# Patient Record
Sex: FEMALE | Race: BLACK OR AFRICAN AMERICAN | NOT HISPANIC OR LATINO | Employment: OTHER | URBAN - METROPOLITAN AREA
[De-identification: names, ages, dates, MRNs, and addresses within clinical notes are randomized per-mention and may not be internally consistent; named-entity substitution may affect disease eponyms.]

---

## 2020-09-23 RX ORDER — CLOPIDOGREL BISULFATE 75 MG/1
TABLET ORAL
Qty: 30 TABLET | Refills: 0 | Status: SHIPPED | OUTPATIENT
Start: 2020-09-23 | End: 2020-10-20

## 2020-10-05 ENCOUNTER — TELEPHONE (OUTPATIENT)
Dept: CARDIOLOGY | Facility: CLINIC | Age: 70
End: 2020-10-05

## 2020-10-05 RX ORDER — METOPROLOL TARTRATE 25 MG/1
TABLET, FILM COATED ORAL
Qty: 30 TABLET | Refills: 11 | Status: SHIPPED | OUTPATIENT
Start: 2020-10-05 | End: 2020-10-07 | Stop reason: SDUPTHER

## 2020-10-05 NOTE — TELEPHONE ENCOUNTER
----- Message from Nevaeh Eckert sent at 10/5/2020  8:52 AM CDT -----  Regarding: medication and lab results  Please call patient to follow up about her medication request she is out of medicine  and lab results [phone]

## 2020-10-08 RX ORDER — METOPROLOL TARTRATE 25 MG/1
25 TABLET, FILM COATED ORAL 2 TIMES DAILY
Qty: 90 TABLET | Refills: 3 | Status: SHIPPED | OUTPATIENT
Start: 2020-10-08 | End: 2021-10-04 | Stop reason: SDUPTHER

## 2020-12-17 ENCOUNTER — OFFICE VISIT (OUTPATIENT)
Dept: CARDIOLOGY | Facility: CLINIC | Age: 70
End: 2020-12-17
Payer: MEDICARE

## 2020-12-17 VITALS
RESPIRATION RATE: 20 BRPM | SYSTOLIC BLOOD PRESSURE: 148 MMHG | DIASTOLIC BLOOD PRESSURE: 88 MMHG | OXYGEN SATURATION: 97 % | BODY MASS INDEX: 36.1 KG/M2 | WEIGHT: 230 LBS | HEIGHT: 67 IN | HEART RATE: 76 BPM

## 2020-12-17 DIAGNOSIS — I25.10 CORONARY ARTERY DISEASE WITHOUT ANGINA PECTORIS, UNSPECIFIED VESSEL OR LESION TYPE, UNSPECIFIED WHETHER NATIVE OR TRANSPLANTED HEART: ICD-10-CM

## 2020-12-17 DIAGNOSIS — E78.5 DYSLIPIDEMIA: ICD-10-CM

## 2020-12-17 DIAGNOSIS — I10 HYPERTENSION, UNSPECIFIED TYPE: Primary | ICD-10-CM

## 2020-12-17 DIAGNOSIS — R60.0 PERIPHERAL EDEMA: ICD-10-CM

## 2020-12-17 PROCEDURE — 99214 OFFICE O/P EST MOD 30 MIN: CPT | Mod: S$GLB,,, | Performed by: INTERNAL MEDICINE

## 2020-12-17 PROCEDURE — 99214 PR OFFICE/OUTPT VISIT, EST, LEVL IV, 30-39 MIN: ICD-10-PCS | Mod: S$GLB,,, | Performed by: INTERNAL MEDICINE

## 2020-12-17 RX ORDER — PANTOPRAZOLE SODIUM 40 MG/1
40 TABLET, DELAYED RELEASE ORAL DAILY
COMMUNITY
End: 2022-05-31 | Stop reason: SDUPTHER

## 2020-12-17 RX ORDER — TERAZOSIN 5 MG/1
5 CAPSULE ORAL NIGHTLY
Qty: 90 CAPSULE | Refills: 3 | Status: SHIPPED | OUTPATIENT
Start: 2020-12-17 | End: 2021-01-27 | Stop reason: DRUGHIGH

## 2020-12-17 RX ORDER — TERAZOSIN 1 MG/1
1 CAPSULE ORAL NIGHTLY
Qty: 1 CAPSULE | Refills: 0 | Status: SHIPPED | OUTPATIENT
Start: 2020-12-17 | End: 2020-12-18

## 2020-12-17 RX ORDER — TERAZOSIN 5 MG/1
5 CAPSULE ORAL NIGHTLY
Qty: 30 CAPSULE | Refills: 11 | Status: SHIPPED | OUTPATIENT
Start: 2020-12-22 | End: 2021-01-26 | Stop reason: SDUPTHER

## 2020-12-17 RX ORDER — TERAZOSIN 1 MG/1
1 CAPSULE ORAL NIGHTLY
Qty: 30 CAPSULE | Refills: 0 | Status: SHIPPED | OUTPATIENT
Start: 2020-12-17 | End: 2021-01-27

## 2020-12-17 RX ORDER — TRIAMTERENE AND HYDROCHLOROTHIAZIDE 37.5; 25 MG/1; MG/1
1 CAPSULE ORAL EVERY MORNING
COMMUNITY
End: 2021-06-02 | Stop reason: SDUPTHER

## 2020-12-17 RX ORDER — ROSUVASTATIN CALCIUM 10 MG/1
10 TABLET, COATED ORAL NIGHTLY
COMMUNITY
End: 2021-06-21

## 2020-12-17 RX ORDER — TERAZOSIN 2 MG/1
2 CAPSULE ORAL NIGHTLY
Qty: 4 CAPSULE | Refills: 0 | Status: SHIPPED | OUTPATIENT
Start: 2020-12-17 | End: 2020-12-21

## 2020-12-17 RX ORDER — CALCIUM CARBONATE 300MG(750)
400 TABLET,CHEWABLE ORAL DAILY
COMMUNITY
End: 2022-10-03 | Stop reason: SDUPTHER

## 2020-12-17 NOTE — ASSESSMENT & PLAN NOTE
She is elevated today  Stop Norvasc  Start Hytrin at 1 mg every night and increase this to 2 mg every night.   Continue checking BP at home.

## 2020-12-17 NOTE — PROGRESS NOTES
Subjective:    Patient ID:  Lindsey Green is a 69 y.o. female patient here for evaluation Hypertension, Coronary Artery Disease, Dyslipidemia, and Gastroesophageal Reflux      History of Present Illness:   Ms. Green is here today for her follow up visit. She denies chest pain or SOB. No arm neck or jaw pain. No lightheaded or dizziness. She has been doing well. Her blood pressure is elevated today, but her legs are hurting her. She is having some edema bilateral legs. She is on amlodipine.           Review of patient's allergies indicates:   Allergen Reactions    Pcn [penicillins]        Past Medical History:   Diagnosis Date    Coronary artery disease involving native coronary artery 11/22/2016    Hypertension      Past Surgical History:   Procedure Laterality Date    ABDOMINAL SURGERY      hysterectomy    BREAST SURGERY      lumpectomy    COLON SURGERY      FRACTURE SURGERY      right wrist    HYSTERECTOMY       Social History     Tobacco Use    Smoking status: Never Smoker   Substance Use Topics    Alcohol use: No    Drug use: No        Review of Systems:    As noted in HPI in addition      REVIEW OF SYSTEMS  CARDIOVASCULAR: No recent chest pain, palpitations, arm, neck, or jaw pain  RESPIRATORY: No recent fever, cough chills, SOB or congestion  : No blood in the urine  GI: No Nausea, vomiting, constipation, diarrhea, blood, or reflux.  MUSCULOSKELETAL: + pain to legs  NEURO: No lightheadedness or dizziness  EYES: No Double vision, blurry, vision or headache              Objective        Vitals:    12/17/20 1247   BP: (!) 148/88   Pulse: 76   Resp: 20       LIPIDS - LAST 2   Lab Results   Component Value Date    CHOL 257 (H) 11/21/2016    HDL 63 11/21/2016    LDLCALC 171.0 (H) 11/21/2016    TRIG 115 11/21/2016    CHOLHDL 24.5 11/21/2016       CBC - LAST 2  Lab Results   Component Value Date    WBC 5.40 11/24/2016    WBC 8.00 11/23/2016    RBC 4.67 11/24/2016    RBC 4.85 11/23/2016    HGB 13.5  11/24/2016    HGB 14.0 11/23/2016    HCT 40.7 11/24/2016    HCT 42.3 11/23/2016    MCV 87 11/24/2016    MCV 87 11/23/2016    MCH 28.9 11/24/2016    MCH 28.9 11/23/2016    MCHC 33.1 11/24/2016    MCHC 33.1 11/23/2016    RDW 13.0 11/24/2016    RDW 13.5 11/23/2016     11/24/2016     11/23/2016    MPV 9.6 11/24/2016    MPV 9.5 11/23/2016    GRAN 2.7 11/24/2016    GRAN 49.4 11/24/2016    LYMPH 2.1 11/24/2016    LYMPH 37.7 11/24/2016    MONO 0.6 11/24/2016    MONO 10.9 11/24/2016    BASO 0.10 11/24/2016    BASO 0.10 11/23/2016       CHEMISTRY & LIVER FUNCTION - LAST 2  Lab Results   Component Value Date     11/24/2016     11/23/2016    K 4.0 11/24/2016    K 4.1 11/23/2016     11/24/2016     11/23/2016    CO2 25 11/24/2016    CO2 23 11/23/2016    ANIONGAP 7 (L) 11/24/2016    ANIONGAP 8 11/23/2016    BUN 12 11/24/2016    BUN 11 11/23/2016    CREATININE 1.0 11/24/2016    CREATININE 1.1 11/23/2016    GLU 91 11/24/2016     (H) 11/23/2016    CALCIUM 9.0 11/24/2016    CALCIUM 9.2 11/23/2016    MG 2.3 11/22/2016    ALBUMIN 3.4 (L) 11/23/2016    ALBUMIN 4.0 11/21/2016    PROT 6.2 11/23/2016    PROT 7.1 11/21/2016    ALKPHOS 74 11/23/2016    ALKPHOS 81 11/21/2016    ALT 29 11/23/2016    ALT 13 11/21/2016     (H) 11/23/2016    AST 24 11/21/2016    BILITOT 0.6 11/23/2016    BILITOT 0.5 11/21/2016        CARDIAC PROFILE - LAST 2  Lab Results   Component Value Date    TROPONINI 37.870 (H) 11/23/2016    TROPONINI 47.066 (H) 11/22/2016        COAGULATION - LAST 2  No results found for: LABPT, INR, APTT    ENDOCRINE & PSA - LAST 2  No results found for: HGBA1C, MICROALBUR, TSH, PROCAL, PSA     ECHOCARDIOGRAM RESULTS  No results found for this or any previous visit.    CURRENT/PREVIOUS VISIT EKG  Results for orders placed or performed during the hospital encounter of 11/21/16   EKG 12-LEAD starting tomorrow    Collection Time: 11/23/16 12:31 AM    Narrative    Test Reason :  I25.10    Vent. Rate : 068 BPM     Atrial Rate : 068 BPM     P-R Int : 174 ms          QRS Dur : 080 ms      QT Int : 470 ms       P-R-T Axes : 058 -26 -55 degrees     QTc Int : 499 ms    Normal sinus rhythm  Inferior infarct (cited on or before 21-NOV-2016)  T wave abnormality, consider anterolateral ischemia  Consider right ventricular involvement in acute inferior infarct  Abnormal ECG  When compared with ECG of 22-NOV-2016 04:58,  Borderline criteria for Lateral infarct are no longer Present  Serial changes of Inferior infarct Present  Confirmed by Zane Booth MD ([id number]) on 12/11/2016 3:07:51 PM    Referred By: JORGE BOYER           Confirmed By:Zane Booth MD         PHYSICAL EXAM  CONSTITUTIONAL: Well built, well nourished in no apparent distress  NECK: no carotid bruit, no JVD  LUNGS: CTA  CHEST WALL: no tenderness  HEART: regular rate and rhythm, S1, S2 normal, no murmur, click, rub or gallop   ABDOMEN: soft, non-tender; bowel sounds normal; no masses,  no organomegaly  EXTREMITIES: mild edema bilaterally  NEURO: AAO X 3    I HAVE REVIEWED :    The vital signs, nurses notes, and all the pertinent radiology and labs.        Current Outpatient Medications   Medication Instructions    aspirin (ECOTRIN) 81 mg, Oral, Daily    clopidogreL (PLAVIX) 75 mg tablet Take 1 tablet by mouth once daily    lisinopriL (PRINIVIL,ZESTRIL) 20 mg, Oral, 2 times daily    magnesium oxide 400 mg, Oral, Daily    metoprolol tartrate (LOPRESSOR) 25 mg, Oral, 2 times daily    pantoprazole (PROTONIX) 40 mg, Oral, Daily    potassium 99 mg Tab Oral, Once    rosuvastatin (CRESTOR) 10 mg, Oral, Nightly    triamterene-hydrochlorothiazide 37.5-25 mg (DYAZIDE) 37.5-25 mg per capsule 1 capsule, Oral, Every morning          Assessment & Plan     Peripheral edema  On amlodipine  Hold this   Start Hytrin 1 mg every night x 2 days then 2 mg QHS    Dyslipidemia  Continue Crestor 10 mg daily    Hypertension  She is elevated today  Stop  Norvasc  Start Hytrin at 1 mg every night and increase this to 2 mg every night.   Continue checking BP at home.     Coronary artery disease without angina pectoris  Continue Plavix and ASA  Continue Statin      No follow-ups on file.

## 2020-12-21 ENCOUNTER — TELEPHONE (OUTPATIENT)
Dept: CARDIOLOGY | Facility: CLINIC | Age: 70
End: 2020-12-21

## 2020-12-21 NOTE — TELEPHONE ENCOUNTER
----- Message from Nevaeh Eckert sent at 12/21/2020  8:35 AM CST -----  Regarding: medication  patient needs someone to call her regarding medication she said multiple refills has been called in some with different mg and she does not need them and the pharmacy is holding her responsible for them payment [phone]

## 2020-12-21 NOTE — TELEPHONE ENCOUNTER
She was questioning why she had a second terazosin sent in, [facility]. She didn't double check when she picked up the second one. I advised her to keep track of her bp and keep the meds. Do not call in for a refill and if a auto refill of the med is given not to accept. She has enough for 2-3 months

## 2020-12-28 ENCOUNTER — TELEPHONE (OUTPATIENT)
Dept: CARDIOLOGY | Facility: CLINIC | Age: 70
End: 2020-12-28

## 2020-12-28 NOTE — TELEPHONE ENCOUNTER
Called patient told her keep the 1 mg if the BP is staying within normal range then it is ok the 5 mg is to much try 2 mg if still goes up to call me before doing the 5 mg again

## 2020-12-28 NOTE — TELEPHONE ENCOUNTER
----- Message from Nathaniel Maldonado sent at 12/28/2020 10:58 AM CST -----  Regarding: MEDS NOT FEELING GOOD  TeRAZOSIN   SHE SAID THE 5MG TAB HAD HER NOT FEELIN GGOOD COULDN'T KEEP HER HEAD UP   SHE SAID SHE THOUGHT IT WAS TAKING HER OUT OF HERE   SHOULD SHE GO BACK TO 1MG     STOPPED IT sATURDAY BECAUSE OF HOW SHE FELT       [phone]

## 2021-01-26 ENCOUNTER — TELEPHONE (OUTPATIENT)
Dept: CARDIOLOGY | Facility: CLINIC | Age: 71
End: 2021-01-26

## 2021-01-26 RX ORDER — TERAZOSIN 5 MG/1
5 CAPSULE ORAL NIGHTLY
Qty: 90 CAPSULE | Refills: 3 | Status: SHIPPED | OUTPATIENT
Start: 2021-01-26 | End: 2021-01-27 | Stop reason: DRUGHIGH

## 2021-03-08 RX ORDER — TERAZOSIN 1 MG/1
1 CAPSULE ORAL NIGHTLY
Qty: 90 CAPSULE | Refills: 3 | Status: SHIPPED | OUTPATIENT
Start: 2021-03-08 | End: 2021-04-26 | Stop reason: SDUPTHER

## 2021-04-26 RX ORDER — TERAZOSIN 1 MG/1
1 CAPSULE ORAL NIGHTLY
Qty: 90 CAPSULE | Refills: 3 | Status: SHIPPED | OUTPATIENT
Start: 2021-04-26 | End: 2022-10-03

## 2021-05-17 ENCOUNTER — TELEPHONE (OUTPATIENT)
Dept: CARDIOLOGY | Facility: CLINIC | Age: 71
End: 2021-05-17

## 2021-05-17 DIAGNOSIS — I25.10 CORONARY ARTERY DISEASE WITHOUT ANGINA PECTORIS, UNSPECIFIED VESSEL OR LESION TYPE, UNSPECIFIED WHETHER NATIVE OR TRANSPLANTED HEART: ICD-10-CM

## 2021-05-17 DIAGNOSIS — R60.0 PERIPHERAL EDEMA: Primary | ICD-10-CM

## 2021-05-17 DIAGNOSIS — E78.5 DYSLIPIDEMIA: ICD-10-CM

## 2021-05-17 DIAGNOSIS — I25.119 CORONARY ARTERY DISEASE INVOLVING NATIVE CORONARY ARTERY OF NATIVE HEART WITH ANGINA PECTORIS: ICD-10-CM

## 2021-05-31 ENCOUNTER — LAB VISIT (OUTPATIENT)
Dept: LAB | Facility: HOSPITAL | Age: 71
End: 2021-05-31
Attending: NURSE PRACTITIONER
Payer: MEDICARE

## 2021-05-31 ENCOUNTER — TELEPHONE (OUTPATIENT)
Dept: CARDIOLOGY | Facility: CLINIC | Age: 71
End: 2021-05-31

## 2021-05-31 ENCOUNTER — OFFICE VISIT (OUTPATIENT)
Dept: CARDIOLOGY | Facility: CLINIC | Age: 71
End: 2021-05-31
Payer: MEDICARE

## 2021-05-31 VITALS
BODY MASS INDEX: 36.37 KG/M2 | HEART RATE: 73 BPM | WEIGHT: 232.19 LBS | SYSTOLIC BLOOD PRESSURE: 122 MMHG | OXYGEN SATURATION: 98 % | DIASTOLIC BLOOD PRESSURE: 76 MMHG

## 2021-05-31 DIAGNOSIS — R63.5 WEIGHT GAIN: ICD-10-CM

## 2021-05-31 DIAGNOSIS — E78.5 DYSLIPIDEMIA: ICD-10-CM

## 2021-05-31 DIAGNOSIS — R60.0 PERIPHERAL EDEMA: ICD-10-CM

## 2021-05-31 DIAGNOSIS — I10 HYPERTENSION, UNSPECIFIED TYPE: ICD-10-CM

## 2021-05-31 DIAGNOSIS — R63.5 WEIGHT GAIN: Primary | ICD-10-CM

## 2021-05-31 DIAGNOSIS — I25.119 CORONARY ARTERY DISEASE INVOLVING NATIVE CORONARY ARTERY OF NATIVE HEART WITH ANGINA PECTORIS: ICD-10-CM

## 2021-05-31 DIAGNOSIS — I25.10 CORONARY ARTERY DISEASE WITHOUT ANGINA PECTORIS, UNSPECIFIED VESSEL OR LESION TYPE, UNSPECIFIED WHETHER NATIVE OR TRANSPLANTED HEART: ICD-10-CM

## 2021-05-31 LAB — TSH SERPL DL<=0.005 MIU/L-ACNC: 0.98 UIU/ML (ref 0.34–5.6)

## 2021-05-31 PROCEDURE — 99214 OFFICE O/P EST MOD 30 MIN: CPT | Mod: S$GLB,,, | Performed by: NURSE PRACTITIONER

## 2021-05-31 PROCEDURE — 84443 ASSAY THYROID STIM HORMONE: CPT | Performed by: NURSE PRACTITIONER

## 2021-05-31 PROCEDURE — 99214 PR OFFICE/OUTPT VISIT, EST, LEVL IV, 30-39 MIN: ICD-10-PCS | Mod: S$GLB,,, | Performed by: NURSE PRACTITIONER

## 2021-05-31 PROCEDURE — 36415 COLL VENOUS BLD VENIPUNCTURE: CPT | Performed by: NURSE PRACTITIONER

## 2021-06-02 RX ORDER — TRIAMTERENE AND HYDROCHLOROTHIAZIDE 37.5; 25 MG/1; MG/1
1 CAPSULE ORAL EVERY MORNING
Qty: 90 CAPSULE | Refills: 3 | Status: SHIPPED | OUTPATIENT
Start: 2021-06-02 | End: 2022-05-31

## 2021-07-09 ENCOUNTER — TELEPHONE (OUTPATIENT)
Dept: CARDIOLOGY | Facility: CLINIC | Age: 71
End: 2021-07-09

## 2021-07-12 ENCOUNTER — TELEPHONE (OUTPATIENT)
Dept: CARDIOLOGY | Facility: CLINIC | Age: 71
End: 2021-07-12

## 2021-08-24 ENCOUNTER — TELEPHONE (OUTPATIENT)
Dept: CARDIOLOGY | Facility: CLINIC | Age: 71
End: 2021-08-24

## 2021-09-07 ENCOUNTER — TELEPHONE (OUTPATIENT)
Dept: CARDIOLOGY | Facility: CLINIC | Age: 71
End: 2021-09-07

## 2021-10-04 ENCOUNTER — OFFICE VISIT (OUTPATIENT)
Dept: CARDIOLOGY | Facility: CLINIC | Age: 71
End: 2021-10-04
Payer: MEDICARE

## 2021-10-04 VITALS
HEART RATE: 72 BPM | RESPIRATION RATE: 16 BRPM | BODY MASS INDEX: 36.57 KG/M2 | SYSTOLIC BLOOD PRESSURE: 132 MMHG | DIASTOLIC BLOOD PRESSURE: 88 MMHG | HEIGHT: 67 IN | WEIGHT: 233 LBS

## 2021-10-04 DIAGNOSIS — E78.5 DYSLIPIDEMIA: Primary | ICD-10-CM

## 2021-10-04 DIAGNOSIS — I25.10 CORONARY ARTERY DISEASE WITHOUT ANGINA PECTORIS, UNSPECIFIED VESSEL OR LESION TYPE, UNSPECIFIED WHETHER NATIVE OR TRANSPLANTED HEART: ICD-10-CM

## 2021-10-04 DIAGNOSIS — I10 PRIMARY HYPERTENSION: ICD-10-CM

## 2021-10-04 PROCEDURE — 99214 OFFICE O/P EST MOD 30 MIN: CPT | Mod: S$GLB,,, | Performed by: INTERNAL MEDICINE

## 2021-10-04 PROCEDURE — 99214 PR OFFICE/OUTPT VISIT, EST, LEVL IV, 30-39 MIN: ICD-10-PCS | Mod: S$GLB,,, | Performed by: INTERNAL MEDICINE

## 2021-10-04 RX ORDER — METOPROLOL TARTRATE 25 MG/1
12.5 TABLET, FILM COATED ORAL 2 TIMES DAILY
Qty: 180 TABLET | Refills: 3 | Status: SHIPPED | OUTPATIENT
Start: 2021-10-04 | End: 2021-10-08

## 2021-11-01 ENCOUNTER — TELEPHONE (OUTPATIENT)
Dept: CARDIOLOGY | Facility: CLINIC | Age: 71
End: 2021-11-01
Payer: MEDICARE

## 2021-11-02 RX ORDER — CLOPIDOGREL BISULFATE 75 MG/1
75 TABLET ORAL DAILY
Qty: 90 TABLET | Refills: 3 | Status: CANCELLED | OUTPATIENT
Start: 2021-11-02

## 2021-11-12 RX ORDER — CLOPIDOGREL BISULFATE 75 MG/1
75 TABLET ORAL DAILY
Qty: 90 TABLET | Refills: 3 | Status: SHIPPED | OUTPATIENT
Start: 2021-11-12 | End: 2023-01-18

## 2022-05-11 ENCOUNTER — TELEPHONE (OUTPATIENT)
Dept: CARDIOLOGY | Facility: CLINIC | Age: 72
End: 2022-05-11
Payer: MEDICARE

## 2022-05-11 NOTE — TELEPHONE ENCOUNTER
----- Message from Wes Erazo sent at 5/11/2022 10:12 AM CDT -----  Type:  Sooner Appointment Request    Caller is requesting a sooner appointment.  Caller declined first available appointment listed below.  Caller will not accept being placed on the waitlist and is requesting a message be sent to doctor.    Name of Caller:  Pt  When is the first available appointment?    Symptoms:  check up 7 months  Best Call Back Number:  [phone]  Additional Information:    Leave vm  if no answer  Please advise == Thank you

## 2022-05-12 ENCOUNTER — TELEPHONE (OUTPATIENT)
Dept: CARDIOLOGY | Facility: CLINIC | Age: 72
End: 2022-05-12
Payer: MEDICARE

## 2022-05-12 NOTE — TELEPHONE ENCOUNTER
----- Message from Wes Erazo sent at 5/12/2022  3:46 PM CDT -----  Type:  Patient Returning Call    Who Called:  Pt  Who Left Message for Patient:  Apple  Does the patient know what this is regarding?:  appt  Best Call Back Number: [phone]  Additional Information:  Sts she just wants to keep check hasn't seen him since last year.  Please advise -- thank you

## 2022-05-19 ENCOUNTER — TELEPHONE (OUTPATIENT)
Dept: CARDIOLOGY | Facility: CLINIC | Age: 72
End: 2022-05-19
Payer: MEDICARE

## 2022-05-19 NOTE — TELEPHONE ENCOUNTER
----- Message from Lazaro Lewis sent at 5/19/2022  4:28 PM CDT -----  Contact: pt  Type: Needs Medical Advice    Who Called:pt  Best Call Back Number:[phone] make appt leave message if need be     Additional Information: Requesting callback regarding got called to RS appt   Please Advise-Thank you

## 2022-05-20 ENCOUNTER — TELEPHONE (OUTPATIENT)
Dept: CARDIOLOGY | Facility: CLINIC | Age: 72
End: 2022-05-20
Payer: MEDICARE

## 2022-05-20 NOTE — TELEPHONE ENCOUNTER
----- Message from Lazaro Lewis sent at 5/20/2022  2:32 PM CDT -----  Will make it to appt 05/23 at 1pm pt confirmed

## 2022-05-23 ENCOUNTER — OFFICE VISIT (OUTPATIENT)
Dept: CARDIOLOGY | Facility: CLINIC | Age: 72
End: 2022-05-23
Payer: MEDICARE

## 2022-05-23 VITALS
SYSTOLIC BLOOD PRESSURE: 126 MMHG | BODY MASS INDEX: 35.63 KG/M2 | OXYGEN SATURATION: 98 % | DIASTOLIC BLOOD PRESSURE: 80 MMHG | WEIGHT: 227 LBS | HEART RATE: 68 BPM | HEIGHT: 67 IN

## 2022-05-23 DIAGNOSIS — I25.119 CORONARY ARTERY DISEASE INVOLVING NATIVE CORONARY ARTERY OF NATIVE HEART WITH ANGINA PECTORIS: ICD-10-CM

## 2022-05-23 DIAGNOSIS — F41.9 ANXIETY: ICD-10-CM

## 2022-05-23 DIAGNOSIS — R60.0 PERIPHERAL EDEMA: ICD-10-CM

## 2022-05-23 DIAGNOSIS — I10 PRIMARY HYPERTENSION: ICD-10-CM

## 2022-05-23 DIAGNOSIS — I25.10 CORONARY ARTERY DISEASE WITHOUT ANGINA PECTORIS, UNSPECIFIED VESSEL OR LESION TYPE, UNSPECIFIED WHETHER NATIVE OR TRANSPLANTED HEART: Primary | ICD-10-CM

## 2022-05-23 DIAGNOSIS — E78.5 DYSLIPIDEMIA: ICD-10-CM

## 2022-05-23 PROCEDURE — 99214 PR OFFICE/OUTPT VISIT, EST, LEVL IV, 30-39 MIN: ICD-10-PCS | Mod: S$GLB,,, | Performed by: NURSE PRACTITIONER

## 2022-05-23 PROCEDURE — 3008F BODY MASS INDEX DOCD: CPT | Mod: CPTII,S$GLB,, | Performed by: NURSE PRACTITIONER

## 2022-05-23 PROCEDURE — 3079F DIAST BP 80-89 MM HG: CPT | Mod: CPTII,S$GLB,, | Performed by: NURSE PRACTITIONER

## 2022-05-23 PROCEDURE — 3288F PR FALLS RISK ASSESSMENT DOCUMENTED: ICD-10-PCS | Mod: CPTII,S$GLB,, | Performed by: NURSE PRACTITIONER

## 2022-05-23 PROCEDURE — 1159F MED LIST DOCD IN RCRD: CPT | Mod: CPTII,S$GLB,, | Performed by: NURSE PRACTITIONER

## 2022-05-23 PROCEDURE — 93000 EKG 12-LEAD: ICD-10-PCS | Mod: S$GLB,,, | Performed by: INTERNAL MEDICINE

## 2022-05-23 PROCEDURE — 1125F AMNT PAIN NOTED PAIN PRSNT: CPT | Mod: CPTII,S$GLB,, | Performed by: NURSE PRACTITIONER

## 2022-05-23 PROCEDURE — 3288F FALL RISK ASSESSMENT DOCD: CPT | Mod: CPTII,S$GLB,, | Performed by: NURSE PRACTITIONER

## 2022-05-23 PROCEDURE — 4010F ACE/ARB THERAPY RXD/TAKEN: CPT | Mod: CPTII,S$GLB,, | Performed by: NURSE PRACTITIONER

## 2022-05-23 PROCEDURE — 1159F PR MEDICATION LIST DOCUMENTED IN MEDICAL RECORD: ICD-10-PCS | Mod: CPTII,S$GLB,, | Performed by: NURSE PRACTITIONER

## 2022-05-23 PROCEDURE — 1101F PT FALLS ASSESS-DOCD LE1/YR: CPT | Mod: CPTII,S$GLB,, | Performed by: NURSE PRACTITIONER

## 2022-05-23 PROCEDURE — 3074F PR MOST RECENT SYSTOLIC BLOOD PRESSURE < 130 MM HG: ICD-10-PCS | Mod: CPTII,S$GLB,, | Performed by: NURSE PRACTITIONER

## 2022-05-23 PROCEDURE — 3079F PR MOST RECENT DIASTOLIC BLOOD PRESSURE 80-89 MM HG: ICD-10-PCS | Mod: CPTII,S$GLB,, | Performed by: NURSE PRACTITIONER

## 2022-05-23 PROCEDURE — 99214 OFFICE O/P EST MOD 30 MIN: CPT | Mod: S$GLB,,, | Performed by: NURSE PRACTITIONER

## 2022-05-23 PROCEDURE — 3074F SYST BP LT 130 MM HG: CPT | Mod: CPTII,S$GLB,, | Performed by: NURSE PRACTITIONER

## 2022-05-23 PROCEDURE — 1125F PR PAIN SEVERITY QUANTIFIED, PAIN PRESENT: ICD-10-PCS | Mod: CPTII,S$GLB,, | Performed by: NURSE PRACTITIONER

## 2022-05-23 PROCEDURE — 1101F PR PT FALLS ASSESS DOC 0-1 FALLS W/OUT INJ PAST YR: ICD-10-PCS | Mod: CPTII,S$GLB,, | Performed by: NURSE PRACTITIONER

## 2022-05-23 PROCEDURE — 4010F PR ACE/ARB THEARPY RXD/TAKEN: ICD-10-PCS | Mod: CPTII,S$GLB,, | Performed by: NURSE PRACTITIONER

## 2022-05-23 PROCEDURE — 3008F PR BODY MASS INDEX (BMI) DOCUMENTED: ICD-10-PCS | Mod: CPTII,S$GLB,, | Performed by: NURSE PRACTITIONER

## 2022-05-23 PROCEDURE — 93000 ELECTROCARDIOGRAM COMPLETE: CPT | Mod: S$GLB,,, | Performed by: INTERNAL MEDICINE

## 2022-05-23 RX ORDER — SERTRALINE HYDROCHLORIDE 25 MG/1
25 TABLET, FILM COATED ORAL DAILY
Qty: 30 TABLET | Refills: 11 | Status: SHIPPED | OUTPATIENT
Start: 2022-05-23 | End: 2023-05-11

## 2022-05-23 NOTE — ASSESSMENT & PLAN NOTE
History of PCI of the right coronary artery she is currently on aspirin and Plavix continue the same.     No Bleeding tendencies.  Bruising has improved  Last Myoview negative in 2018 no new anginal symptoms

## 2022-05-23 NOTE — PROGRESS NOTES
Subjective:    Patient ID:  Lindsey Green is a 71 y.o. female patient here for evaluation Follow-up (Follow up visit 6 months. LEFT leg apin)      History of Present Illness:       Ms. Green is here today for her follow up visit. She denies chest pain or SOB. No arm neck or jaw pain. Her left ankle has some pain and edema at times. No coughing or congestion. No orthopnea. No palpitations. BP stable today 126/80.      Review of patient's allergies indicates:   Allergen Reactions    Pcn [penicillins]        Past Medical History:   Diagnosis Date    Coronary artery disease involving native coronary artery 11/22/2016    Hypertension      Past Surgical History:   Procedure Laterality Date    ABDOMINAL SURGERY      hysterectomy    BREAST SURGERY      lumpectomy    COLON SURGERY      FRACTURE SURGERY      right wrist    HYSTERECTOMY       Social History     Tobacco Use    Smoking status: Never Smoker   Substance Use Topics    Alcohol use: No    Drug use: No        Review of Systems:    As noted in HPI in addition      REVIEW OF SYSTEMS  CARDIOVASCULAR: No recent chest pain, palpitations, arm, neck, or jaw pain  RESPIRATORY: No recent fever, cough chills, SOB or congestion  : No blood in the urine  GI: No Nausea, vomiting, constipation, diarrhea, blood, or reflux.  MUSCULOSKELETAL: left ankle pain  NEURO: No lightheadedness or dizziness  EYES: No Double vision, blurry, vision or headache              Objective        Vitals:    05/23/22 1030   BP: 126/80   Pulse: 68       LIPIDS - LAST 2   Lab Results   Component Value Date    CHOL 257 (H) 11/21/2016    HDL 63 11/21/2016    LDLCALC 171.0 (H) 11/21/2016    TRIG 115 11/21/2016    CHOLHDL 24.5 11/21/2016       CBC - LAST 2  Lab Results   Component Value Date    WBC 5.40 11/24/2016    WBC 8.00 11/23/2016    RBC 4.67 11/24/2016    RBC 4.85 11/23/2016    HGB 13.5 11/24/2016    HGB 14.0 11/23/2016    HCT 40.7 11/24/2016    HCT 42.3 11/23/2016    MCV 87 11/24/2016     MCV 87 11/23/2016    MCH 28.9 11/24/2016    MCH 28.9 11/23/2016    MCHC 33.1 11/24/2016    MCHC 33.1 11/23/2016    RDW 13.0 11/24/2016    RDW 13.5 11/23/2016     11/24/2016     11/23/2016    MPV 9.6 11/24/2016    MPV 9.5 11/23/2016    GRAN 2.7 11/24/2016    GRAN 49.4 11/24/2016    LYMPH 2.1 11/24/2016    LYMPH 37.7 11/24/2016    MONO 0.6 11/24/2016    MONO 10.9 11/24/2016    BASO 0.10 11/24/2016    BASO 0.10 11/23/2016       CHEMISTRY & LIVER FUNCTION - LAST 2  Lab Results   Component Value Date     11/24/2016     11/23/2016    K 4.0 11/24/2016    K 4.1 11/23/2016     11/24/2016     11/23/2016    CO2 25 11/24/2016    CO2 23 11/23/2016    ANIONGAP 7 (L) 11/24/2016    ANIONGAP 8 11/23/2016    BUN 12 11/24/2016    BUN 11 11/23/2016    CREATININE 1.0 11/24/2016    CREATININE 1.1 11/23/2016    GLU 91 11/24/2016     (H) 11/23/2016    CALCIUM 9.0 11/24/2016    CALCIUM 9.2 11/23/2016    MG 2.3 11/22/2016    ALBUMIN 3.4 (L) 11/23/2016    ALBUMIN 4.0 11/21/2016    PROT 6.2 11/23/2016    PROT 7.1 11/21/2016    ALKPHOS 74 11/23/2016    ALKPHOS 81 11/21/2016    ALT 29 11/23/2016    ALT 13 11/21/2016     (H) 11/23/2016    AST 24 11/21/2016    BILITOT 0.6 11/23/2016    BILITOT 0.5 11/21/2016        CARDIAC PROFILE - LAST 2  Lab Results   Component Value Date    TROPONINI 37.870 (H) 11/23/2016    TROPONINI 47.066 (H) 11/22/2016        COAGULATION - LAST 2  No results found for: LABPT, INR, APTT    ENDOCRINE & PSA - LAST 2  Lab Results   Component Value Date    TSH 0.980 05/31/2021        ECHOCARDIOGRAM RESULTS  No results found for this or any previous visit.      CURRENT/PREVIOUS VISIT EKG  Results for orders placed or performed during the hospital encounter of 11/21/16   EKG 12-LEAD starting tomorrow    Collection Time: 11/23/16 12:31 AM    Narrative    Test Reason : I25.10    Vent. Rate : 068 BPM     Atrial Rate : 068 BPM     P-R Int : 174 ms          QRS Dur : 080 ms       QT Int : 470 ms       P-R-T Axes : 058 -26 -55 degrees     QTc Int : 499 ms    Normal sinus rhythm  Inferior infarct (cited on or before 21-NOV-2016)  Abnormal ECG  When compared with ECG of 22-NOV-2016 04:58,  Borderline criteria for Lateral infarct are no longer Present  Serial changes of Inferior infarct Present  Confirmed by Zane Booth MD ([id number]) on 12/11/2016 3:07:51 PM    Referred By: JORGE BOYER           Confirmed By:Zane Booth MD         PHYSICAL EXAM  CONSTITUTIONAL: Well built, well nourished in no apparent distress  NECK: no carotid bruit, no JVD  LUNGS: CTA  CHEST WALL: no tenderness  HEART: regular rate and rhythm, S1, S2 normal, no murmur, click, rub or gallop   ABDOMEN: soft, non-tender; bowel sounds normal; no masses,  no organomegaly  EXTREMITIES: Extremities normal, no edema, no calf tenderness noted  NEURO: AAO X 3    I HAVE REVIEWED :    The vital signs, nurses notes, and all the pertinent radiology and labs.        Current Outpatient Medications   Medication Instructions    aspirin (ECOTRIN) 81 mg, Oral, Daily    clopidogreL (PLAVIX) 75 mg tablet Take 1 tablet by mouth once daily    clopidogreL (PLAVIX) 75 mg, Oral, Daily    lisinopriL (PRINIVIL,ZESTRIL) 20 MG tablet Take 1 tablet by mouth twice daily    magnesium oxide 400 mg, Oral, Daily    metoprolol tartrate (LOPRESSOR) 25 MG tablet Take 1/2 (one-half) tablet by mouth twice daily    pantoprazole (PROTONIX) 40 mg, Oral, Daily    potassium 99 mg Tab Oral, Once    rosuvastatin (CRESTOR) 10 MG tablet TAKE 1 TABLET BY MOUTH AT BEDTIME (STOP SIMVASTAIN)    sertraline (ZOLOFT) 25 mg, Oral, Daily    terazosin (HYTRIN) 1 mg, Oral, Nightly    triamterene-hydrochlorothiazide 37.5-25 mg (DYAZIDE) 37.5-25 mg per capsule 1 capsule, Oral, Every morning          Assessment & Plan     Anxiety  Start Zoloft 25 mg daily      Primary hypertension  BP well controlled on present regimen.   Continue the same    Coronary artery disease involving  native coronary artery of native heart with angina pectoris  H/O PCI RCA  Patient doing well continue the same.  EKG today stable.    Dyslipidemia  Continue Crestor 10 mg daily  Check labs    Coronary artery disease without angina pectoris  History of PCI of the right coronary artery she is currently on aspirin and Plavix continue the same.     No Bleeding tendencies.  Bruising has improved  Last Myoview negative in 2018 no new anginal symptoms      Peripheral edema  Check BNP  Only to one ankle left at times on and off  May be arthritis or gout   Will check labs            No follow-ups on file.

## 2022-05-23 NOTE — ASSESSMENT & PLAN NOTE
Check BNP  Only to one ankle left at times on and off  May be arthritis or gout   Will check labs

## 2022-05-31 NOTE — TELEPHONE ENCOUNTER
----- Message from Raquel Masters sent at 5/31/2022 11:40 AM CDT -----  Contact: pt  Type:  RX Refill Request    Who Called: pt  Refill or New Rx: New Rx  RX Name and Strength:        pantoprazole (PROTONIX) 40 MG tablet  How is the patient currently taking it? (ex. 1XDay):  as ordered  Is this a 30 day or 90 day RX:  90  Preferred Pharmacy with phone number:    [facility] [address] [place], [place] [address] - [address]  [address]  [place] [address]  Phone: [phone] Fax: [phone]      Local or Mail Order:  local  Ordering Provider:  -  Best Call Back Number:  [phone]  Additional Information:  pt is out and needs a new rx  for antoprazole (PROTONIX) 40 MG tablet [facility] to [facility]. Pt thought she had a refill but [facility] informed her she did

## 2022-06-07 RX ORDER — PANTOPRAZOLE SODIUM 40 MG/1
40 TABLET, DELAYED RELEASE ORAL DAILY
Qty: 90 TABLET | Refills: 3 | Status: SHIPPED | OUTPATIENT
Start: 2022-06-07 | End: 2023-05-29 | Stop reason: SDUPTHER

## 2022-06-07 RX ORDER — TRIAMTERENE AND HYDROCHLOROTHIAZIDE 37.5; 25 MG/1; MG/1
CAPSULE ORAL
Qty: 90 CAPSULE | Refills: 3 | Status: SHIPPED | OUTPATIENT
Start: 2022-06-07 | End: 2023-05-29 | Stop reason: SDUPTHER

## 2022-07-15 ENCOUNTER — TELEPHONE (OUTPATIENT)
Dept: CARDIOLOGY | Facility: CLINIC | Age: 72
End: 2022-07-15
Payer: MEDICARE

## 2022-07-15 NOTE — TELEPHONE ENCOUNTER
----- Message from Raquel Masters sent at 7/15/2022  4:27 PM CDT -----  Contact: pt  Type: Needs Medical Advice         Who Called: pt  Best Call Back Number:[phone]  Additional Information: Requesting a call back regarding pt received a letter on 07/07 stating she needs a follow up appt. Pt would like office to call her about an appt.   Please Advise- Thank you

## 2022-07-18 ENCOUNTER — TELEPHONE (OUTPATIENT)
Dept: CARDIOLOGY | Facility: CLINIC | Age: 72
End: 2022-07-18
Payer: MEDICARE

## 2022-07-18 NOTE — TELEPHONE ENCOUNTER
----- Message from Raquel Masters sent at 7/18/2022  4:51 PM CDT -----  Contact: pt  Type: Needs Medical Advice         Who Called: PT   Best Call Back Number:[phone]  Additional Information: Requesting a call back regarding Pt is asking for office to call her back. She received a letter stating she needs t o have a follow up appt but pt is not aware of why and needs office to call her. If she doesn't answer please leave a voicemail     Please Advise- Thank you

## 2022-07-19 NOTE — TELEPHONE ENCOUNTER
Spoke with pt, states she needs an appointment in November. Advised pt , to call back in September as November hasn't been released as of yet. Pt verbalized understanding.

## 2022-08-04 ENCOUNTER — TELEPHONE (OUTPATIENT)
Dept: CARDIOLOGY | Facility: CLINIC | Age: 72
End: 2022-08-04
Payer: MEDICARE

## 2022-08-04 RX ORDER — LISINOPRIL 20 MG/1
20 TABLET ORAL 2 TIMES DAILY
Qty: 180 TABLET | Refills: 3 | Status: SHIPPED | OUTPATIENT
Start: 2022-08-04 | End: 2022-08-08 | Stop reason: ALTCHOICE

## 2022-08-04 NOTE — TELEPHONE ENCOUNTER
----- Message from Thea Tai sent at 8/4/2022 10:07 AM CDT -----  Type:  RX Refill Request  Who Called: Pt   Refill or New Rx: refill  RX Name and Strength: lisinopriL (PRINIVIL,ZESTRIL) 20 MG tablet 180 tablet   How is the patient currently taking it? (ex. 1XDay):    Is this a 30 day or 90 day RX:    Preferred Pharmacy with phone number:  [address], [place] - [address]   Phone:  [phone]  Fax:  [phone]  Local or Mail Order:  Local  Ordering Provider:  Nuno Neri MD  Best Call Back Number:  [phone]  Additional Information: Pt requesting refill on Rx lisinopriL (PRINIVIL,ZESTRIL) 20 MG tablet 180 tablet , pt is out as of today.

## 2022-08-04 NOTE — TELEPHONE ENCOUNTER
PATIENT IS AWARE I DO NOT HAVE AN OPENING FOR DR. BOWLING FOR A BIT, SHE SAID SHE WILL WAIT UNTIL THE October SCHEDULE IS OUT TO GET AN APPOINTMENT

## 2022-08-04 NOTE — TELEPHONE ENCOUNTER
----- Message from Thea Tai sent at 8/4/2022 10:10 AM CDT -----  Type: Needs Medical Advice  Who Called: Pt   Symptoms (please be specific):   How long has patient had these symptoms:    Pharmacy name and phone #:    Best Call Back Number: [phone]  Additional Information: Pt requesting a call back to schedule an appt, pt only wants to see Dr Neri.

## 2022-10-03 ENCOUNTER — OFFICE VISIT (OUTPATIENT)
Dept: CARDIOLOGY | Facility: CLINIC | Age: 72
End: 2022-10-03
Payer: MEDICARE

## 2022-10-03 VITALS
WEIGHT: 223 LBS | SYSTOLIC BLOOD PRESSURE: 134 MMHG | DIASTOLIC BLOOD PRESSURE: 80 MMHG | OXYGEN SATURATION: 99 % | HEART RATE: 65 BPM | HEIGHT: 67 IN | RESPIRATION RATE: 16 BRPM | BODY MASS INDEX: 35 KG/M2

## 2022-10-03 DIAGNOSIS — I10 PRIMARY HYPERTENSION: ICD-10-CM

## 2022-10-03 DIAGNOSIS — F41.9 ANXIETY: ICD-10-CM

## 2022-10-03 DIAGNOSIS — E78.5 DYSLIPIDEMIA: ICD-10-CM

## 2022-10-03 DIAGNOSIS — R60.0 PERIPHERAL EDEMA: ICD-10-CM

## 2022-10-03 DIAGNOSIS — I25.119 CORONARY ARTERY DISEASE INVOLVING NATIVE CORONARY ARTERY OF NATIVE HEART WITH ANGINA PECTORIS: ICD-10-CM

## 2022-10-03 PROCEDURE — 3288F FALL RISK ASSESSMENT DOCD: CPT | Mod: CPTII,S$GLB,, | Performed by: INTERNAL MEDICINE

## 2022-10-03 PROCEDURE — 4010F PR ACE/ARB THEARPY RXD/TAKEN: ICD-10-PCS | Mod: CPTII,S$GLB,, | Performed by: INTERNAL MEDICINE

## 2022-10-03 PROCEDURE — 3079F DIAST BP 80-89 MM HG: CPT | Mod: CPTII,S$GLB,, | Performed by: INTERNAL MEDICINE

## 2022-10-03 PROCEDURE — 3075F SYST BP GE 130 - 139MM HG: CPT | Mod: CPTII,S$GLB,, | Performed by: INTERNAL MEDICINE

## 2022-10-03 PROCEDURE — 1101F PR PT FALLS ASSESS DOC 0-1 FALLS W/OUT INJ PAST YR: ICD-10-PCS | Mod: CPTII,S$GLB,, | Performed by: INTERNAL MEDICINE

## 2022-10-03 PROCEDURE — 3079F PR MOST RECENT DIASTOLIC BLOOD PRESSURE 80-89 MM HG: ICD-10-PCS | Mod: CPTII,S$GLB,, | Performed by: INTERNAL MEDICINE

## 2022-10-03 PROCEDURE — 3008F BODY MASS INDEX DOCD: CPT | Mod: CPTII,S$GLB,, | Performed by: INTERNAL MEDICINE

## 2022-10-03 PROCEDURE — 1126F AMNT PAIN NOTED NONE PRSNT: CPT | Mod: CPTII,S$GLB,, | Performed by: INTERNAL MEDICINE

## 2022-10-03 PROCEDURE — 1101F PT FALLS ASSESS-DOCD LE1/YR: CPT | Mod: CPTII,S$GLB,, | Performed by: INTERNAL MEDICINE

## 2022-10-03 PROCEDURE — 3008F PR BODY MASS INDEX (BMI) DOCUMENTED: ICD-10-PCS | Mod: CPTII,S$GLB,, | Performed by: INTERNAL MEDICINE

## 2022-10-03 PROCEDURE — 1126F PR PAIN SEVERITY QUANTIFIED, NO PAIN PRESENT: ICD-10-PCS | Mod: CPTII,S$GLB,, | Performed by: INTERNAL MEDICINE

## 2022-10-03 PROCEDURE — 3075F PR MOST RECENT SYSTOLIC BLOOD PRESS GE 130-139MM HG: ICD-10-PCS | Mod: CPTII,S$GLB,, | Performed by: INTERNAL MEDICINE

## 2022-10-03 PROCEDURE — 3288F PR FALLS RISK ASSESSMENT DOCUMENTED: ICD-10-PCS | Mod: CPTII,S$GLB,, | Performed by: INTERNAL MEDICINE

## 2022-10-03 PROCEDURE — 1159F PR MEDICATION LIST DOCUMENTED IN MEDICAL RECORD: ICD-10-PCS | Mod: CPTII,S$GLB,, | Performed by: INTERNAL MEDICINE

## 2022-10-03 PROCEDURE — 99214 PR OFFICE/OUTPT VISIT, EST, LEVL IV, 30-39 MIN: ICD-10-PCS | Mod: S$GLB,,, | Performed by: INTERNAL MEDICINE

## 2022-10-03 PROCEDURE — 1159F MED LIST DOCD IN RCRD: CPT | Mod: CPTII,S$GLB,, | Performed by: INTERNAL MEDICINE

## 2022-10-03 PROCEDURE — 4010F ACE/ARB THERAPY RXD/TAKEN: CPT | Mod: CPTII,S$GLB,, | Performed by: INTERNAL MEDICINE

## 2022-10-03 PROCEDURE — 1160F RVW MEDS BY RX/DR IN RCRD: CPT | Mod: CPTII,S$GLB,, | Performed by: INTERNAL MEDICINE

## 2022-10-03 PROCEDURE — 1160F PR REVIEW ALL MEDS BY PRESCRIBER/CLIN PHARMACIST DOCUMENTED: ICD-10-PCS | Mod: CPTII,S$GLB,, | Performed by: INTERNAL MEDICINE

## 2022-10-03 PROCEDURE — 99214 OFFICE O/P EST MOD 30 MIN: CPT | Mod: S$GLB,,, | Performed by: INTERNAL MEDICINE

## 2022-10-03 RX ORDER — METOPROLOL TARTRATE 25 MG/1
TABLET, FILM COATED ORAL
Qty: 90 TABLET | Refills: 3 | Status: SHIPPED | OUTPATIENT
Start: 2022-10-03 | End: 2023-09-07 | Stop reason: SDUPTHER

## 2022-10-03 RX ORDER — ACETAMINOPHEN 160 MG/5ML
200 SUSPENSION, ORAL (FINAL DOSE FORM) ORAL DAILY
Qty: 90 CAPSULE | Refills: 3 | Status: SHIPPED | OUTPATIENT
Start: 2022-10-03

## 2022-10-03 RX ORDER — EZETIMIBE 10 MG/1
10 TABLET ORAL DAILY
Qty: 90 TABLET | Refills: 3 | Status: SHIPPED | OUTPATIENT
Start: 2022-10-03 | End: 2022-12-07

## 2022-10-03 RX ORDER — LANOLIN ALCOHOL/MO/W.PET/CERES
400 CREAM (GRAM) TOPICAL DAILY
Qty: 90 TABLET | Refills: 3 | Status: SHIPPED | OUTPATIENT
Start: 2022-10-03 | End: 2023-10-03

## 2022-10-03 RX ORDER — ACETAMINOPHEN 160 MG/5ML
200 SUSPENSION, ORAL (FINAL DOSE FORM) ORAL DAILY
COMMUNITY
End: 2022-10-03 | Stop reason: SDUPTHER

## 2022-10-03 NOTE — ASSESSMENT & PLAN NOTE
Blood pressure is stable on lisinopril 20 mg once a day continue on low-fat low low-salt diet and continue low doses of metoprolol tartrate 12.5 mg p.o. b.i.d. she is off Hytrin at this time blood pressure is 134/80 mmHg.

## 2022-10-03 NOTE — ASSESSMENT & PLAN NOTE
She is due for repeat blood work.  As a last LDL was still elevated recommend to add Zetia 10 mg to her regimen continue Crestor 10 mg a day maintain on low-fat low-cholesterol diet

## 2022-10-03 NOTE — ASSESSMENT & PLAN NOTE
In view of are are ACS presentation continue on dual antiplatelet therapy continue metoprolol tartrate 12.5 mg p.o. b.i.d. and risk factor modification with Crestor add Zetia 10 mg to her regimen as well

## 2022-10-03 NOTE — PROGRESS NOTES
Subjective:    Patient ID:  Lindsey Green is a 71 y.o. female patient here for evaluation Coronary Artery Disease, Dyslipidemia, and Results (Labs in epic)      History of Present Illness:       Patient is a 69-year-old lady with history of known coronary artery disease and previous presentation with ACS had been doing very well on antiplatelet therapy.  No occurrence of any angina noted she does have some swelling in the left ankle only and she had prior sprain in this ankle while working in the [employer].  Otherwise there is no calf tenderness and no shortness of breath no she remains reasonably active and through this effort no angina noted.    She has maintain on Crestor 10 mg daily no also due for repeat lipid profile she has a started back on Co Q10 200 mg a day      Review of patient's allergies indicates:   Allergen Reactions    Pcn [penicillins]        Past Medical History:   Diagnosis Date    Coronary artery disease involving native coronary artery 11/22/2016    Hypertension      Past Surgical History:   Procedure Laterality Date    ABDOMINAL SURGERY      hysterectomy    BREAST SURGERY      lumpectomy    COLON SURGERY      FRACTURE SURGERY      right wrist    HYSTERECTOMY       Social History     Tobacco Use    Smoking status: Never    Smokeless tobacco: Never   Substance Use Topics    Alcohol use: No    Drug use: No        Review of Systems:    As noted in HPI in addition      REVIEW OF SYSTEMS  CARDIOVASCULAR: No recent chest pain, palpitations, arm, neck, or jaw pain  RESPIRATORY: No recent fever, cough chills, SOB or congestion  : No blood in the urine  GI: No Nausea, vomiting, constipation, diarrhea, blood, or reflux.  MUSCULOSKELETAL: No myalgias, mild swelling in the left lower extremity  NEURO: No lightheadedness or dizziness  EYES: No Double vision, blurry, vision or headache              Objective        Vitals:    10/03/22 0827   BP: 134/80   Pulse: 65   Resp: 16       LIPIDS - LAST 2   Lab  Results   Component Value Date    CHOL 257 (H) 11/21/2016    HDL 63 11/21/2016    LDLCALC 171.0 (H) 11/21/2016    TRIG 115 11/21/2016    CHOLHDL 24.5 11/21/2016       CBC - LAST 2  Lab Results   Component Value Date    WBC 5.40 11/24/2016    WBC 8.00 11/23/2016    RBC 4.67 11/24/2016    RBC 4.85 11/23/2016    HGB 13.5 11/24/2016    HGB 14.0 11/23/2016    HCT 40.7 11/24/2016    HCT 42.3 11/23/2016    MCV 87 11/24/2016    MCV 87 11/23/2016    MCH 28.9 11/24/2016    MCH 28.9 11/23/2016    MCHC 33.1 11/24/2016    MCHC 33.1 11/23/2016    RDW 13.0 11/24/2016    RDW 13.5 11/23/2016     11/24/2016     11/23/2016    MPV 9.6 11/24/2016    MPV 9.5 11/23/2016    GRAN 2.7 11/24/2016    GRAN 49.4 11/24/2016    LYMPH 2.1 11/24/2016    LYMPH 37.7 11/24/2016    MONO 0.6 11/24/2016    MONO 10.9 11/24/2016    BASO 0.10 11/24/2016    BASO 0.10 11/23/2016       CHEMISTRY & LIVER FUNCTION - LAST 2  Lab Results   Component Value Date     11/24/2016     11/23/2016    K 4.0 11/24/2016    K 4.1 11/23/2016     11/24/2016     11/23/2016    CO2 25 11/24/2016    CO2 23 11/23/2016    ANIONGAP 7 (L) 11/24/2016    ANIONGAP 8 11/23/2016    BUN 12 11/24/2016    BUN 11 11/23/2016    CREATININE 1.0 11/24/2016    CREATININE 1.1 11/23/2016    GLU 91 11/24/2016     (H) 11/23/2016    CALCIUM 9.0 11/24/2016    CALCIUM 9.2 11/23/2016    MG 2.3 11/22/2016    ALBUMIN 3.4 (L) 11/23/2016    ALBUMIN 4.0 11/21/2016    PROT 6.2 11/23/2016    PROT 7.1 11/21/2016    ALKPHOS 74 11/23/2016    ALKPHOS 81 11/21/2016    ALT 29 11/23/2016    ALT 13 11/21/2016     (H) 11/23/2016    AST 24 11/21/2016    BILITOT 0.6 11/23/2016    BILITOT 0.5 11/21/2016        CARDIAC PROFILE - LAST 2  Lab Results   Component Value Date    TROPONINI 37.870 (H) 11/23/2016    TROPONINI 47.066 (H) 11/22/2016        COAGULATION - LAST 2  No results found for: LABPT, INR, APTT    ENDOCRINE & PSA - LAST 2  Lab Results   Component Value Date    TSH  0.980 05/31/2021        ECHOCARDIOGRAM RESULTS  No results found for this or any previous visit.      CURRENT/PREVIOUS VISIT EKG  Results for orders placed or performed in visit on 05/23/22   IN OFFICE EKG 12-LEAD (to [place])    Collection Time: 05/23/22 10:33 AM    Narrative    Test Reason : I25.10,    Vent. Rate : 064 BPM     Atrial Rate : 064 BPM     P-R Int : 162 ms          QRS Dur : 082 ms      QT Int : 392 ms       P-R-T Axes : 091 035 010 degrees     QTc Int : 404 ms    Normal sinus rhythm  Low voltage QRS  Inferior infarct (cited on or before 21-NOV-2016)  Possible Anterolateral infarct ,age undetermined  Abnormal ECG  When compared with ECG of 23-NOV-2016 00:31,  Significant changes have occurred  Confirmed by Nuno Neri MD ([id number]) on 6/1/2022 9:09:03 AM    Referred By:             Confirmed By:Nuno Neri MD     No valid procedures specified.   No results found for this or any previous visit.    No valid procedures specified.    PHYSICAL EXAM  CONSTITUTIONAL: Well built, well nourished in no apparent distress  NECK: no carotid bruit, no JVD  LUNGS: CTA  CHEST WALL: no tenderness  HEART: regular rate and rhythm, S1, S2 normal, no murmur, click, rub or gallop   ABDOMEN: soft, non-tender; bowel sounds normal; no masses,  no organomegaly  EXTREMITIES: Extremities trace edema in left lower extremity with no calf tenderness   NEURO: AAO X 3    I HAVE REVIEWED :    The vital signs, nurses notes, and all the pertinent radiology and labs.        Current Outpatient Medications   Medication Instructions    aspirin (ECOTRIN) 81 mg, Oral, Daily    clopidogreL (PLAVIX) 75 mg, Oral, Daily    coenzyme Q10 200 mg, Oral, Daily    ezetimibe (ZETIA) 10 mg, Oral, Daily    lisinopriL (PRINIVIL,ZESTRIL) 20 MG tablet Take 1 tablet by mouth twice daily    magnesium oxide (MAG-OX) 400 mg, Oral, Daily    metoprolol tartrate (LOPRESSOR) 25 MG tablet Take 1/2 (one-half) tablet by mouth twice daily Strength: 25 mg     pantoprazole (PROTONIX) 40 mg, Oral, Daily    potassium 99 mg Tab Oral, Once    rosuvastatin (CRESTOR) 10 MG tablet TAKE 1 TABLET BY MOUTH AT BEDTIME STOP  SIMVASTATIN    sertraline (ZOLOFT) 25 mg, Oral, Daily    triamterene-hydrochlorothiazide 37.5-25 mg (DYAZIDE) 37.5-25 mg per capsule Take 1 capsule by mouth once daily in the morning          Assessment & Plan     Anxiety  This is much better this time continue present regimen    Coronary artery disease involving native coronary artery of native heart with angina pectoris  In view of are are ACS presentation continue on dual antiplatelet therapy continue metoprolol tartrate 12.5 mg p.o. b.i.d. and risk factor modification with Crestor add Zetia 10 mg to her regimen as well    Dyslipidemia  She is due for repeat blood work.  As a last LDL was still elevated recommend to add Zetia 10 mg to her regimen continue Crestor 10 mg a day maintain on low-fat low-cholesterol diet    Peripheral edema  Edema is limited left lower extremities probably secondary to some local injury in the past.  There is no calf tenderness continue on elevation conservative treatment.  And supportive stockings if necessary    Primary hypertension  Blood pressure is stable on lisinopril 20 mg once a day continue on low-fat low low-salt diet and continue low doses of metoprolol tartrate 12.5 mg p.o. b.i.d. she is off Hytrin at this time blood pressure is 134/80 mmHg.    Recheck labs including lipids and hepatic profile.        Follow up in about 6 months (around 4/3/2023).

## 2022-10-03 NOTE — ASSESSMENT & PLAN NOTE
Edema is limited left lower extremities probably secondary to some local injury in the past.  There is no calf tenderness continue on elevation conservative treatment.  And supportive stockings if necessary

## 2022-11-02 ENCOUNTER — TELEPHONE (OUTPATIENT)
Dept: CARDIOLOGY | Facility: CLINIC | Age: 72
End: 2022-11-02
Payer: MEDICARE

## 2022-11-02 NOTE — TELEPHONE ENCOUNTER
----- Message from Apple Moran MA sent at 11/2/2022 11:54 AM CDT -----  Contact: pt at  [phone]    ----- Message -----  From: Bernard Mitchell  Sent: 11/2/2022  11:11 AM CDT  To: Daron Harris Staff    Type: Needs Medical Advice  Who Called:  pt  Best Call Back Number: [phone]  Additional Information: pt is calling the office to return a call back that she missed to find out where to go for lab work. Please call back and advise.

## 2022-11-04 ENCOUNTER — LAB VISIT (OUTPATIENT)
Dept: LAB | Facility: HOSPITAL | Age: 72
End: 2022-11-04
Attending: INTERNAL MEDICINE
Payer: MEDICARE

## 2022-11-04 DIAGNOSIS — I25.119 CORONARY ARTERY DISEASE INVOLVING NATIVE CORONARY ARTERY OF NATIVE HEART WITH ANGINA PECTORIS: ICD-10-CM

## 2022-11-04 DIAGNOSIS — E78.5 DYSLIPIDEMIA: ICD-10-CM

## 2022-11-04 LAB
ALBUMIN SERPL BCP-MCNC: 4.2 G/DL (ref 3.5–5.2)
ALP SERPL-CCNC: 55 U/L (ref 55–135)
ALT SERPL W/O P-5'-P-CCNC: 16 U/L (ref 10–44)
AST SERPL-CCNC: 29 U/L (ref 10–40)
BILIRUB DIRECT SERPL-MCNC: 0.1 MG/DL (ref 0.1–0.3)
BILIRUB SERPL-MCNC: 0.9 MG/DL (ref 0.1–1)
CHOLEST SERPL-MCNC: 149 MG/DL (ref 120–199)
CHOLEST/HDLC SERPL: 1.9 {RATIO} (ref 2–5)
HDLC SERPL-MCNC: 78 MG/DL (ref 40–75)
HDLC SERPL: 52.3 % (ref 20–50)
LDLC SERPL CALC-MCNC: 64.8 MG/DL (ref 63–159)
NONHDLC SERPL-MCNC: 71 MG/DL
PROT SERPL-MCNC: 7 G/DL (ref 6–8.4)
TRIGL SERPL-MCNC: 31 MG/DL (ref 30–150)

## 2022-11-04 PROCEDURE — 80061 LIPID PANEL: CPT | Performed by: INTERNAL MEDICINE

## 2022-11-04 PROCEDURE — 36415 COLL VENOUS BLD VENIPUNCTURE: CPT | Performed by: INTERNAL MEDICINE

## 2022-11-04 PROCEDURE — 80076 HEPATIC FUNCTION PANEL: CPT | Performed by: INTERNAL MEDICINE

## 2022-12-06 ENCOUNTER — TELEPHONE (OUTPATIENT)
Dept: CARDIOLOGY | Facility: CLINIC | Age: 72
End: 2022-12-06
Payer: MEDICARE

## 2022-12-06 NOTE — TELEPHONE ENCOUNTER
----- Message from Naseem Russell sent at 12/6/2022 10:35 AM CST -----  Type: Needs Medical Advice  Who Called:  Pt   Pharmacy name and phone #:    [id number] - [place], [place] - [address]  [address]  [place] [address]  Phone: [phone] Fax: [phone]      Best Call Back Number: [phone]    Additional Information: pt acalled in to get a cheaper medication or something that is close what she has she needs a call back asap

## 2022-12-06 NOTE — TELEPHONE ENCOUNTER
----- Message from Thea Tai sent at 12/6/2022  1:56 PM CST -----  Type:  Patient Returning Call  Who Called:  Pt   Who Left Message for Patient:  Apple   Does the patient know what this is regarding?:  Rx ezetimibe (ZETIA) 10 mg tablet 90 tablet   Best Call Back Number:  [phone]  Additional Information:  Pt missed call from Apple, pt wants to inform Dr Neri that the Rx ezetimibe (ZETIA) 10 mg tablet 90 tablet is too expensive and requesting something cheaper to purchase.  [facility] [address] [place] [address], [place] - [address]   Phone:  [phone]  Fax:  [phone]

## 2022-12-07 RX ORDER — ROSUVASTATIN CALCIUM 20 MG/1
20 TABLET, COATED ORAL DAILY
COMMUNITY
End: 2022-12-07 | Stop reason: SDUPTHER

## 2022-12-07 NOTE — TELEPHONE ENCOUNTER
----- Message from Naseem Russell sent at 12/7/2022  4:46 PM CST -----  Contact: pt at [phone]  Type: Needs Medical Advice  Who Called:  pt   Best Call Back Number: [phone]    Additional Information: pt call back to return a call about her medication please call back to advise the pt

## 2022-12-08 ENCOUNTER — TELEPHONE (OUTPATIENT)
Dept: CARDIOLOGY | Facility: CLINIC | Age: 72
End: 2022-12-08
Payer: MEDICARE

## 2022-12-08 NOTE — TELEPHONE ENCOUNTER
----- Message from Stacey Muro sent at 12/8/2022 10:18 AM CST -----  Contact: Self  Patient is calling to check on the status of the medication changed to the rosuvastatin (CRESTOR) 20 MG tablet since she can't afford the  ezetimibe (ZETIA) 10 mg tablet. Stated she was told they were sending it in to the pharmacy last night.    [facility] [address] [place], [place] [address], [place] - [address]  [address] [facility] [place] [address]  Phone: [phone] Fax: [phone]    Patient is currently at [facility] to pick it up and it is not there, can we make sure we get this medication called in and make sure the other medications are cancelled and the pharmacy is aware of what medications she is to take. Please call pt back to advise so she can  the correct medication at [phone]. Thank You.

## 2022-12-12 RX ORDER — ROSUVASTATIN CALCIUM 20 MG/1
20 TABLET, COATED ORAL DAILY
Qty: 90 TABLET | Refills: 3 | Status: SHIPPED | OUTPATIENT
Start: 2022-12-12 | End: 2023-09-07 | Stop reason: SDUPTHER

## 2023-04-17 ENCOUNTER — LAB VISIT (OUTPATIENT)
Dept: LAB | Facility: HOSPITAL | Age: 73
End: 2023-04-17
Attending: INTERNAL MEDICINE
Payer: MEDICARE

## 2023-04-17 ENCOUNTER — OFFICE VISIT (OUTPATIENT)
Dept: CARDIOLOGY | Facility: CLINIC | Age: 73
End: 2023-04-17
Payer: MEDICARE

## 2023-04-17 VITALS
HEIGHT: 67 IN | DIASTOLIC BLOOD PRESSURE: 80 MMHG | HEART RATE: 58 BPM | BODY MASS INDEX: 36.1 KG/M2 | WEIGHT: 230 LBS | OXYGEN SATURATION: 99 % | SYSTOLIC BLOOD PRESSURE: 126 MMHG | RESPIRATION RATE: 16 BRPM

## 2023-04-17 DIAGNOSIS — E78.5 DYSLIPIDEMIA: ICD-10-CM

## 2023-04-17 DIAGNOSIS — R10.13 DYSPEPSIA: ICD-10-CM

## 2023-04-17 DIAGNOSIS — R06.02 SHORTNESS OF BREATH: ICD-10-CM

## 2023-04-17 DIAGNOSIS — I10 PRIMARY HYPERTENSION: ICD-10-CM

## 2023-04-17 DIAGNOSIS — I25.10 CORONARY ARTERY DISEASE INVOLVING NATIVE CORONARY ARTERY OF NATIVE HEART WITHOUT ANGINA PECTORIS: ICD-10-CM

## 2023-04-17 DIAGNOSIS — R60.0 PERIPHERAL EDEMA: ICD-10-CM

## 2023-04-17 LAB
ANION GAP SERPL CALC-SCNC: 9 MMOL/L (ref 8–16)
BNP SERPL-MCNC: 39 PG/ML (ref 0–99)
BUN SERPL-MCNC: 20 MG/DL (ref 8–23)
CALCIUM SERPL-MCNC: 9.4 MG/DL (ref 8.7–10.5)
CHLORIDE SERPL-SCNC: 101 MMOL/L (ref 95–110)
CO2 SERPL-SCNC: 31 MMOL/L (ref 23–29)
CREAT SERPL-MCNC: 1.5 MG/DL (ref 0.5–1.4)
EST. GFR  (NO RACE VARIABLE): 36.6 ML/MIN/1.73 M^2
GLUCOSE SERPL-MCNC: 77 MG/DL (ref 70–110)
POTASSIUM SERPL-SCNC: 4.2 MMOL/L (ref 3.5–5.1)
SODIUM SERPL-SCNC: 141 MMOL/L (ref 136–145)

## 2023-04-17 PROCEDURE — 99214 PR OFFICE/OUTPT VISIT, EST, LEVL IV, 30-39 MIN: ICD-10-PCS | Mod: S$GLB,,, | Performed by: INTERNAL MEDICINE

## 2023-04-17 PROCEDURE — 1160F PR REVIEW ALL MEDS BY PRESCRIBER/CLIN PHARMACIST DOCUMENTED: ICD-10-PCS | Mod: CPTII,S$GLB,, | Performed by: INTERNAL MEDICINE

## 2023-04-17 PROCEDURE — 83880 ASSAY OF NATRIURETIC PEPTIDE: CPT | Performed by: INTERNAL MEDICINE

## 2023-04-17 PROCEDURE — 4010F PR ACE/ARB THEARPY RXD/TAKEN: ICD-10-PCS | Mod: CPTII,S$GLB,, | Performed by: INTERNAL MEDICINE

## 2023-04-17 PROCEDURE — 1126F AMNT PAIN NOTED NONE PRSNT: CPT | Mod: CPTII,S$GLB,, | Performed by: INTERNAL MEDICINE

## 2023-04-17 PROCEDURE — 3288F FALL RISK ASSESSMENT DOCD: CPT | Mod: CPTII,S$GLB,, | Performed by: INTERNAL MEDICINE

## 2023-04-17 PROCEDURE — 99214 OFFICE O/P EST MOD 30 MIN: CPT | Mod: S$GLB,,, | Performed by: INTERNAL MEDICINE

## 2023-04-17 PROCEDURE — 1101F PT FALLS ASSESS-DOCD LE1/YR: CPT | Mod: CPTII,S$GLB,, | Performed by: INTERNAL MEDICINE

## 2023-04-17 PROCEDURE — 3074F SYST BP LT 130 MM HG: CPT | Mod: CPTII,S$GLB,, | Performed by: INTERNAL MEDICINE

## 2023-04-17 PROCEDURE — 36415 COLL VENOUS BLD VENIPUNCTURE: CPT | Performed by: INTERNAL MEDICINE

## 2023-04-17 PROCEDURE — 3288F PR FALLS RISK ASSESSMENT DOCUMENTED: ICD-10-PCS | Mod: CPTII,S$GLB,, | Performed by: INTERNAL MEDICINE

## 2023-04-17 PROCEDURE — 99999 PR PBB SHADOW E&M-EST. PATIENT-LVL III: ICD-10-PCS | Mod: PBBFAC,,, | Performed by: INTERNAL MEDICINE

## 2023-04-17 PROCEDURE — 1126F PR PAIN SEVERITY QUANTIFIED, NO PAIN PRESENT: ICD-10-PCS | Mod: CPTII,S$GLB,, | Performed by: INTERNAL MEDICINE

## 2023-04-17 PROCEDURE — 3074F PR MOST RECENT SYSTOLIC BLOOD PRESSURE < 130 MM HG: ICD-10-PCS | Mod: CPTII,S$GLB,, | Performed by: INTERNAL MEDICINE

## 2023-04-17 PROCEDURE — 99999 PR PBB SHADOW E&M-EST. PATIENT-LVL III: CPT | Mod: PBBFAC,,, | Performed by: INTERNAL MEDICINE

## 2023-04-17 PROCEDURE — 3008F BODY MASS INDEX DOCD: CPT | Mod: CPTII,S$GLB,, | Performed by: INTERNAL MEDICINE

## 2023-04-17 PROCEDURE — 1101F PR PT FALLS ASSESS DOC 0-1 FALLS W/OUT INJ PAST YR: ICD-10-PCS | Mod: CPTII,S$GLB,, | Performed by: INTERNAL MEDICINE

## 2023-04-17 PROCEDURE — 3079F PR MOST RECENT DIASTOLIC BLOOD PRESSURE 80-89 MM HG: ICD-10-PCS | Mod: CPTII,S$GLB,, | Performed by: INTERNAL MEDICINE

## 2023-04-17 PROCEDURE — 4010F ACE/ARB THERAPY RXD/TAKEN: CPT | Mod: CPTII,S$GLB,, | Performed by: INTERNAL MEDICINE

## 2023-04-17 PROCEDURE — 1160F RVW MEDS BY RX/DR IN RCRD: CPT | Mod: CPTII,S$GLB,, | Performed by: INTERNAL MEDICINE

## 2023-04-17 PROCEDURE — 1159F PR MEDICATION LIST DOCUMENTED IN MEDICAL RECORD: ICD-10-PCS | Mod: CPTII,S$GLB,, | Performed by: INTERNAL MEDICINE

## 2023-04-17 PROCEDURE — 3008F PR BODY MASS INDEX (BMI) DOCUMENTED: ICD-10-PCS | Mod: CPTII,S$GLB,, | Performed by: INTERNAL MEDICINE

## 2023-04-17 PROCEDURE — 1159F MED LIST DOCD IN RCRD: CPT | Mod: CPTII,S$GLB,, | Performed by: INTERNAL MEDICINE

## 2023-04-17 PROCEDURE — 80048 BASIC METABOLIC PNL TOTAL CA: CPT | Performed by: INTERNAL MEDICINE

## 2023-04-17 PROCEDURE — 3079F DIAST BP 80-89 MM HG: CPT | Mod: CPTII,S$GLB,, | Performed by: INTERNAL MEDICINE

## 2023-04-17 NOTE — ASSESSMENT & PLAN NOTE
Her blood pressure is stable at 120 6/80 mm Hg continue lisinopril, 20 mg a day, metoprolol 25 mg daily and continue on triamterene hydrochlorothiazide.

## 2023-04-17 NOTE — PROGRESS NOTES
Subjective:    Patient ID:  Lindsey Green is a 72 y.o. female patient here for evaluation Follow-up      History of Present Illness:   A 72-year-old with history of known coronary artery disease has been struggling with weight management.  She is having exertional shortness of breath and effort capacity has been somewhat limited.  Denies having chest discomfort arm neck or jaw pain she is picked up some weight as well currently she is 230 lb.  BMI is 36.02 kilograms/meter squared  No swelling in the lower extremities no significant palpitations are noted no nausea vomiting.  Intermittent joint pains are noted.  Her blood pressure has been stable at 120 6/80 mm Hg  She is seeking help for arm disability plate for her car because of her marked limitations in physical activity.          Review of patient's allergies indicates:   Allergen Reactions    Pcn [penicillins]        Past Medical History:   Diagnosis Date    Coronary artery disease involving native coronary artery 11/22/2016    Hypertension      Past Surgical History:   Procedure Laterality Date    ABDOMINAL SURGERY      hysterectomy    BREAST SURGERY      lumpectomy    COLON SURGERY      FRACTURE SURGERY      right wrist    HYSTERECTOMY       Social History     Tobacco Use    Smoking status: Never    Smokeless tobacco: Never   Substance Use Topics    Alcohol use: No    Drug use: No        Review of Systems:    As noted in HPI in addition      REVIEW OF SYSTEMS  CARDIOVASCULAR: No recent chest pain, palpitations, arm, neck, or jaw pain  RESPIRATORY: No recent fever, cough chills, SOB or congestion  : No blood in the urine  GI: No Nausea, vomiting, constipation, diarrhea, blood, or reflux.  MUSCULOSKELETAL: No myalgias  NEURO: No lightheadedness or dizziness  EYES: No Double vision, blurry, vision or headache              Objective        Vitals:    04/17/23 0958   BP: 126/80   Pulse: (!) 58   Resp: 16       LIPIDS - LAST 2   Lab Results   Component Value  Date    CHOL 149 11/04/2022    CHOL 257 (H) 11/21/2016    HDL 78 (H) 11/04/2022    HDL 63 11/21/2016    LDLCALC 64.8 11/04/2022    LDLCALC 171.0 (H) 11/21/2016    TRIG 31 11/04/2022    TRIG 115 11/21/2016    CHOLHDL 52.3 (H) 11/04/2022    CHOLHDL 24.5 11/21/2016       CBC - LAST 2  Lab Results   Component Value Date    WBC 5.40 11/24/2016    WBC 8.00 11/23/2016    RBC 4.67 11/24/2016    RBC 4.85 11/23/2016    HGB 13.5 11/24/2016    HGB 14.0 11/23/2016    HCT 40.7 11/24/2016    HCT 42.3 11/23/2016    MCV 87 11/24/2016    MCV 87 11/23/2016    MCH 28.9 11/24/2016    MCH 28.9 11/23/2016    MCHC 33.1 11/24/2016    MCHC 33.1 11/23/2016    RDW 13.0 11/24/2016    RDW 13.5 11/23/2016     11/24/2016     11/23/2016    MPV 9.6 11/24/2016    MPV 9.5 11/23/2016    GRAN 2.7 11/24/2016    GRAN 49.4 11/24/2016    LYMPH 2.1 11/24/2016    LYMPH 37.7 11/24/2016    MONO 0.6 11/24/2016    MONO 10.9 11/24/2016    BASO 0.10 11/24/2016    BASO 0.10 11/23/2016       CHEMISTRY & LIVER FUNCTION - LAST 2  Lab Results   Component Value Date     11/24/2016     11/23/2016    K 4.0 11/24/2016    K 4.1 11/23/2016     11/24/2016     11/23/2016    CO2 25 11/24/2016    CO2 23 11/23/2016    ANIONGAP 7 (L) 11/24/2016    ANIONGAP 8 11/23/2016    BUN 12 11/24/2016    BUN 11 11/23/2016    CREATININE 1.0 11/24/2016    CREATININE 1.1 11/23/2016    GLU 91 11/24/2016     (H) 11/23/2016    CALCIUM 9.0 11/24/2016    CALCIUM 9.2 11/23/2016    MG 2.3 11/22/2016    ALBUMIN 4.2 11/04/2022    ALBUMIN 3.4 (L) 11/23/2016    PROT 7.0 11/04/2022    PROT 6.2 11/23/2016    ALKPHOS 55 11/04/2022    ALKPHOS 74 11/23/2016    ALT 16 11/04/2022    ALT 29 11/23/2016    AST 29 11/04/2022     (H) 11/23/2016    BILITOT 0.9 11/04/2022    BILITOT 0.6 11/23/2016        CARDIAC PROFILE - LAST 2  Lab Results   Component Value Date    TROPONINI 37.870 (H) 11/23/2016    TROPONINI 47.066 (H) 11/22/2016        COAGULATION - LAST 2  No  results found for: LABPT, INR, APTT    ENDOCRINE & PSA - LAST 2  Lab Results   Component Value Date    TSH 0.980 05/31/2021        ECHOCARDIOGRAM RESULTS  No results found for this or any previous visit.      CURRENT/PREVIOUS VISIT EKG  Results for orders placed or performed in visit on 05/23/22   IN OFFICE EKG 12-LEAD (to [facility])    Collection Time: 05/23/22 10:33 AM    Narrative    Test Reason : I25.10,    Vent. Rate : 064 BPM     Atrial Rate : 064 BPM     P-R Int : 162 ms          QRS Dur : 082 ms      QT Int : 392 ms       P-R-T Axes : 091 035 010 degrees     QTc Int : 404 ms    Normal sinus rhythm  Low voltage QRS  Inferior infarct (cited on or before 21-NOV-2016)  Possible Anterolateral infarct ,age undetermined  Abnormal ECG  When compared with ECG of 23-NOV-2016 00:31,  Significant changes have occurred  Confirmed by Nuno Neri MD ([id number]) on 6/1/2022 9:09:03 AM    Referred By:             Confirmed By:Nuno Neri MD     No valid procedures specified.   No results found for this or any previous visit.    No valid procedures specified.    PHYSICAL EXAM  CONSTITUTIONAL: Well built, well nourished in no apparent distress  NECK: no carotid bruit, no JVD  LUNGS:  Diminished breath sounds but clear bilaterally   CHEST WALL: no tenderness  HEART: regular rate and rhythm, S1, S2 normal, no murmur, click, rub or gallop   ABDOMEN: soft, obese, non-tender; bowel sounds normal; no masses,  no organomegaly  EXTREMITIES: Extremities normal, no edema, no calf tenderness noted  NEURO: AAO X 3    I HAVE REVIEWED :    The vital signs, nurses notes, and all the pertinent radiology and labs.        Current Outpatient Medications   Medication Instructions    aspirin (ECOTRIN) 81 mg, Oral, Daily    clopidogreL (PLAVIX) 75 mg tablet Take 1 tablet by mouth once daily    coenzyme Q10 200 mg, Oral, Daily    lisinopriL (PRINIVIL,ZESTRIL) 20 MG tablet Take 1 tablet by mouth twice daily    magnesium oxide (MAG-OX) 400 mg, Oral,  Daily    metoprolol tartrate (LOPRESSOR) 25 MG tablet Take 1/2 (one-half) tablet by mouth twice daily Strength: 25 mg    pantoprazole (PROTONIX) 40 mg, Oral, Daily    potassium 99 mg Tab Oral, Once    rosuvastatin (CRESTOR) 20 mg, Oral, Daily    sertraline (ZOLOFT) 25 mg, Oral, Daily    triamterene-hydrochlorothiazide 37.5-25 mg (DYAZIDE) 37.5-25 mg per capsule Take 1 capsule by mouth once daily in the morning          Assessment & Plan     Coronary artery disease without angina pectoris  Stable coronary artery disease continue on risk factor modification.  She has some progressive shortness of breath may be weight related and however it has been several years since she had Repatha revascularization recommend to obtain a Lexiscan Myoview to evaluate for any progression of coronary disease.  In the meanwhile continue on dual antiplatelet therapy, metoprolol tartrate 25 mg daily and Crestor 20 mg daily for risk factor modification.    Primary hypertension  Her blood pressure is stable at 120 6/80 mm Hg continue lisinopril, 20 mg a day, metoprolol 25 mg daily and continue on triamterene hydrochlorothiazide.    Dyslipidemia  Crestor 20 mg once a day maintain low-fat low-cholesterol diet    Peripheral edema  Clinically improved.    Dyspepsia  Continue on pantoprazole 40 mg a day    Shortness of breath  Continue on present therapy including lisinopril 20, arm metoprolol 25 and I will encourage her to obtain a Lexiscan Myoview to evaluate          Follow up in about 6 weeks (around 5/29/2023).

## 2023-04-17 NOTE — ASSESSMENT & PLAN NOTE
Stable coronary artery disease continue on risk factor modification.  She has some progressive shortness of breath may be weight related and however it has been several years since she had Repatha revascularization recommend to obtain a Lexiscan Myoview to evaluate for any progression of coronary disease.  In the meanwhile continue on dual antiplatelet therapy, metoprolol tartrate 25 mg daily and Crestor 20 mg daily for risk factor modification.

## 2023-04-17 NOTE — ASSESSMENT & PLAN NOTE
Continue on present therapy including lisinopril 20, arm metoprolol 25 and I will encourage her to obtain a Lexiscan Myoview to evaluate

## 2023-04-17 NOTE — LETTER
MEDICAL EXAMINERS CERTIFICATION OF MOBILITY IMPAIRMENT    I certify that (Name) Lindsey Green YOB: 1950     Address  [address]  Race/Sex:   Black or /Female                          (City/State/Zip code)  [place], [place] [address]     meets the requirements as outlined in #  (shown on reverse side) and qualifies for a mobility impaired license plate/hang-tag. I further understand that willful and false certification shall subject me to fines/imprisonment as outlined in R.S. 47:463.4 (G) (4).  []PERMANENTLY IMPAIRED(Applicant  []TEMPORARILY IMPAIRED(Applicant has a temporary   has a total or lifelong condition of condition of mobility impairment from which   mobility impairment from which little improvement or recovery can reasonably be expected.   or no improvement or recovery can Applicant is entitled to a hang-tag which will be   reasonably be expected. A medical valid for one (1) year. A medical examiner's   examiner's certification is required on certification is required for renewal of hang-tag)   initial application only).    []UNABLE TO APPEAR  IN PERSON AT  OFFICE    OF MOTOR VEHICLES(Applicant must bring facial photo)                 Medical Examiner's Signature        Date             Printed Name of medical Examiner     State License #             Address    Telephone #          TO BE COMPLETED BY MOTOR VEHICLE ANALYST ONLY    [place]   Lic. Plate #      Hang-tag Control #   Hang-tag ID #      Date Issued    #   Office #

## 2023-04-26 ENCOUNTER — TELEPHONE (OUTPATIENT)
Dept: CARDIOLOGY | Facility: HOSPITAL | Age: 73
End: 2023-04-26

## 2023-04-26 NOTE — TELEPHONE ENCOUNTER
Left message on voicemail.     Patient advised, test will be at [facility] ([address]   Will need to register on the first floor at the main entrance.   Patient advised that arrival time is 6:50am.  Patient advised that she may be here about 3.5-4 hours, and may want to bring something to occupy their time, as there will be periods of waiting.    Patient advised, may take her medications prior to testing if you need to.   Advised if she needs to eat to take her medications, please keep it light, like toast and juice.    Patient advised to avoid all caffeine 12 hours prior to testing.  This includes decaf tea and coffee.    Will provide peanut butter crackers for a snack after stress test.  If patient would prefer something else, please bring a snack from home.    Wear comfortable clothing.   No lotions, oils, or powders to the upper chest area. May wear deodorant.    No metal jewelry, buttons, or zippers to the upper body.  Advised to call the office if any questions.

## 2023-04-27 ENCOUNTER — HOSPITAL ENCOUNTER (OUTPATIENT)
Dept: RADIOLOGY | Facility: HOSPITAL | Age: 73
Discharge: HOME | End: 2023-04-27
Attending: INTERNAL MEDICINE
Payer: MEDICARE

## 2023-04-27 ENCOUNTER — HOSPITAL ENCOUNTER (OUTPATIENT)
Dept: CARDIOLOGY | Facility: HOSPITAL | Age: 73
Discharge: HOME | End: 2023-04-27
Attending: INTERNAL MEDICINE
Payer: MEDICARE

## 2023-04-27 DIAGNOSIS — I25.10 CORONARY ARTERY DISEASE INVOLVING NATIVE CORONARY ARTERY OF NATIVE HEART WITHOUT ANGINA PECTORIS: ICD-10-CM

## 2023-04-27 DIAGNOSIS — R06.02 SHORTNESS OF BREATH: ICD-10-CM

## 2023-04-27 DIAGNOSIS — I25.10 CORONARY ARTERY DISEASE INVOLVING NATIVE CORONARY ARTERY OF NATIVE HEART WITHOUT ANGINA PECTORIS: Primary | ICD-10-CM

## 2023-04-27 PROCEDURE — 78452 NUCLEAR STRESS - CARDIOLOGY INTERPRETED (CUPID ONLY): ICD-10-PCS | Mod: 26,,, | Performed by: INTERNAL MEDICINE

## 2023-04-27 PROCEDURE — A9502 TC99M TETROFOSMIN: HCPCS

## 2023-04-27 PROCEDURE — 93016 PR CARDIAC STRESS TST,DR SUPERV ONLY: ICD-10-PCS | Mod: ,,, | Performed by: NURSE PRACTITIONER

## 2023-04-27 PROCEDURE — 93018 CV STRESS TEST I&R ONLY: CPT | Mod: ,,, | Performed by: INTERNAL MEDICINE

## 2023-04-27 PROCEDURE — 93018 NUCLEAR STRESS - CARDIOLOGY INTERPRETED (CUPID ONLY): ICD-10-PCS | Mod: ,,, | Performed by: INTERNAL MEDICINE

## 2023-04-27 PROCEDURE — 93017 CV STRESS TEST TRACING ONLY: CPT

## 2023-04-27 PROCEDURE — 78452 HT MUSCLE IMAGE SPECT MULT: CPT | Mod: 26,,, | Performed by: INTERNAL MEDICINE

## 2023-04-27 PROCEDURE — 93016 CV STRESS TEST SUPVJ ONLY: CPT | Mod: ,,, | Performed by: NURSE PRACTITIONER

## 2023-04-27 RX ORDER — REGADENOSON 0.08 MG/ML
0.4 INJECTION, SOLUTION INTRAVENOUS ONCE
Status: COMPLETED | OUTPATIENT
Start: 2023-04-27 | End: 2023-04-27

## 2023-04-27 RX ADMIN — REGADENOSON 0.4 MG: 0.08 INJECTION, SOLUTION INTRAVENOUS at 08:04

## 2023-04-28 LAB
CV PHARM DOSE: 0.4 MG
CV STRESS BASE HR: 60 BPM
DIASTOLIC BLOOD PRESSURE: 68 MMHG
EJECTION FRACTION- HIGH: 65 %
END DIASTOLIC INDEX-HIGH: 153 ML/M2
END DIASTOLIC INDEX-LOW: 93 ML/M2
END SYSTOLIC INDEX-HIGH: 71 ML/M2
END SYSTOLIC INDEX-LOW: 31 ML/M2
NUC REST DIASTOLIC VOLUME INDEX: 74
NUC REST EJECTION FRACTION: 47
NUC REST SYSTOLIC VOLUME INDEX: 39
NUC STRESS DIASTOLIC VOLUME INDEX: 75
NUC STRESS EJECTION FRACTION: 72 %
NUC STRESS SYSTOLIC VOLUME INDEX: 21
OHS CV CPX 1 MINUTE RECOVERY HEART RATE: 99 BPM
OHS CV CPX 85 PERCENT MAX PREDICTED HEART RATE MALE: 120
OHS CV CPX MAX PREDICTED HEART RATE: 142
OHS CV CPX PATIENT IS FEMALE: 1
OHS CV CPX PATIENT IS MALE: 0
OHS CV CPX PEAK DIASTOLIC BLOOD PRESSURE: 70 MMHG
OHS CV CPX PEAK HEAR RATE: 101 BPM
OHS CV CPX PEAK RATE PRESSURE PRODUCT: NORMAL
OHS CV CPX PEAK SYSTOLIC BLOOD PRESSURE: 102 MMHG
OHS CV CPX PERCENT MAX PREDICTED HEART RATE ACHIEVED: 71
OHS CV CPX RATE PRESSURE PRODUCT PRESENTING: 7680
RETIRED EF AND QEF - SEE NOTES: 53 %
SYSTOLIC BLOOD PRESSURE: 128 MMHG

## 2023-05-09 ENCOUNTER — TELEPHONE (OUTPATIENT)
Dept: CARDIOLOGY | Facility: CLINIC | Age: 73
End: 2023-05-09
Payer: MEDICARE

## 2023-05-09 NOTE — TELEPHONE ENCOUNTER
----- Message from Whitney Smith NP sent at 5/1/2023  9:02 AM CDT -----  Your stress test is negative for reversible ischemia. The test is about 90% accurate. This means there are no blockages that are causing a problem, ( meaning over 70% blocked.) According to this test you are getting enough blood flow to the coronary arteries. If you are not having any symptoms we can push back your appointment, and save a copay. If you are having symptoms we will be happy to see you.

## 2023-05-19 ENCOUNTER — TELEPHONE (OUTPATIENT)
Dept: CARDIOLOGY | Facility: CLINIC | Age: 73
End: 2023-05-19
Payer: MEDICARE

## 2023-05-19 NOTE — TELEPHONE ENCOUNTER
Called and went over her test results with her. She is set to come in and go over her results in person. She is happy with her negative test

## 2023-05-19 NOTE — TELEPHONE ENCOUNTER
----- Message from iPto Ravi sent at 5/19/2023 10:45 AM CDT -----    Type: Need Medical Advice   Who Called: Patient   Best callback number: [phone]  Additional Information: Patient called about her test results  Please call to further assist, Thanks

## 2023-05-29 ENCOUNTER — OFFICE VISIT (OUTPATIENT)
Dept: CARDIOLOGY | Facility: CLINIC | Age: 73
End: 2023-05-29
Payer: MEDICARE

## 2023-05-29 VITALS
OXYGEN SATURATION: 98 % | HEART RATE: 69 BPM | BODY MASS INDEX: 35.84 KG/M2 | DIASTOLIC BLOOD PRESSURE: 78 MMHG | HEIGHT: 67 IN | WEIGHT: 228.38 LBS | RESPIRATION RATE: 16 BRPM | SYSTOLIC BLOOD PRESSURE: 122 MMHG

## 2023-05-29 DIAGNOSIS — Z13.29 SCREENING FOR THYROID DISORDER: Primary | ICD-10-CM

## 2023-05-29 DIAGNOSIS — F41.9 ANXIETY: ICD-10-CM

## 2023-05-29 DIAGNOSIS — R60.0 PERIPHERAL EDEMA: ICD-10-CM

## 2023-05-29 DIAGNOSIS — R53.83 FATIGUE, UNSPECIFIED TYPE: ICD-10-CM

## 2023-05-29 DIAGNOSIS — I10 PRIMARY HYPERTENSION: ICD-10-CM

## 2023-05-29 DIAGNOSIS — I10 HYPERTENSION, UNSPECIFIED TYPE: ICD-10-CM

## 2023-05-29 DIAGNOSIS — I25.10 CORONARY ARTERY DISEASE INVOLVING NATIVE CORONARY ARTERY OF NATIVE HEART WITHOUT ANGINA PECTORIS: ICD-10-CM

## 2023-05-29 DIAGNOSIS — E78.5 DYSLIPIDEMIA: ICD-10-CM

## 2023-05-29 PROCEDURE — 1159F MED LIST DOCD IN RCRD: CPT | Mod: CPTII,S$GLB,, | Performed by: NURSE PRACTITIONER

## 2023-05-29 PROCEDURE — 1159F PR MEDICATION LIST DOCUMENTED IN MEDICAL RECORD: ICD-10-PCS | Mod: CPTII,S$GLB,, | Performed by: NURSE PRACTITIONER

## 2023-05-29 PROCEDURE — 3078F DIAST BP <80 MM HG: CPT | Mod: CPTII,S$GLB,, | Performed by: NURSE PRACTITIONER

## 2023-05-29 PROCEDURE — 99214 PR OFFICE/OUTPT VISIT, EST, LEVL IV, 30-39 MIN: ICD-10-PCS | Mod: S$GLB,,, | Performed by: NURSE PRACTITIONER

## 2023-05-29 PROCEDURE — 1126F AMNT PAIN NOTED NONE PRSNT: CPT | Mod: CPTII,S$GLB,, | Performed by: NURSE PRACTITIONER

## 2023-05-29 PROCEDURE — 3078F PR MOST RECENT DIASTOLIC BLOOD PRESSURE < 80 MM HG: ICD-10-PCS | Mod: CPTII,S$GLB,, | Performed by: NURSE PRACTITIONER

## 2023-05-29 PROCEDURE — 1160F PR REVIEW ALL MEDS BY PRESCRIBER/CLIN PHARMACIST DOCUMENTED: ICD-10-PCS | Mod: CPTII,S$GLB,, | Performed by: NURSE PRACTITIONER

## 2023-05-29 PROCEDURE — 99214 OFFICE O/P EST MOD 30 MIN: CPT | Mod: S$GLB,,, | Performed by: NURSE PRACTITIONER

## 2023-05-29 PROCEDURE — 4010F ACE/ARB THERAPY RXD/TAKEN: CPT | Mod: CPTII,S$GLB,, | Performed by: NURSE PRACTITIONER

## 2023-05-29 PROCEDURE — 3288F PR FALLS RISK ASSESSMENT DOCUMENTED: ICD-10-PCS | Mod: CPTII,S$GLB,, | Performed by: NURSE PRACTITIONER

## 2023-05-29 PROCEDURE — 3074F SYST BP LT 130 MM HG: CPT | Mod: CPTII,S$GLB,, | Performed by: NURSE PRACTITIONER

## 2023-05-29 PROCEDURE — 1160F RVW MEDS BY RX/DR IN RCRD: CPT | Mod: CPTII,S$GLB,, | Performed by: NURSE PRACTITIONER

## 2023-05-29 PROCEDURE — 3288F FALL RISK ASSESSMENT DOCD: CPT | Mod: CPTII,S$GLB,, | Performed by: NURSE PRACTITIONER

## 2023-05-29 PROCEDURE — 1101F PT FALLS ASSESS-DOCD LE1/YR: CPT | Mod: CPTII,S$GLB,, | Performed by: NURSE PRACTITIONER

## 2023-05-29 PROCEDURE — 3008F BODY MASS INDEX DOCD: CPT | Mod: CPTII,S$GLB,, | Performed by: NURSE PRACTITIONER

## 2023-05-29 PROCEDURE — 3074F PR MOST RECENT SYSTOLIC BLOOD PRESSURE < 130 MM HG: ICD-10-PCS | Mod: CPTII,S$GLB,, | Performed by: NURSE PRACTITIONER

## 2023-05-29 PROCEDURE — 1101F PR PT FALLS ASSESS DOC 0-1 FALLS W/OUT INJ PAST YR: ICD-10-PCS | Mod: CPTII,S$GLB,, | Performed by: NURSE PRACTITIONER

## 2023-05-29 PROCEDURE — 3008F PR BODY MASS INDEX (BMI) DOCUMENTED: ICD-10-PCS | Mod: CPTII,S$GLB,, | Performed by: NURSE PRACTITIONER

## 2023-05-29 PROCEDURE — 99999 PR PBB SHADOW E&M-EST. PATIENT-LVL III: CPT | Mod: PBBFAC,,, | Performed by: NURSE PRACTITIONER

## 2023-05-29 PROCEDURE — 1126F PR PAIN SEVERITY QUANTIFIED, NO PAIN PRESENT: ICD-10-PCS | Mod: CPTII,S$GLB,, | Performed by: NURSE PRACTITIONER

## 2023-05-29 PROCEDURE — 99999 PR PBB SHADOW E&M-EST. PATIENT-LVL III: ICD-10-PCS | Mod: PBBFAC,,, | Performed by: NURSE PRACTITIONER

## 2023-05-29 PROCEDURE — 4010F PR ACE/ARB THEARPY RXD/TAKEN: ICD-10-PCS | Mod: CPTII,S$GLB,, | Performed by: NURSE PRACTITIONER

## 2023-05-29 RX ORDER — FUROSEMIDE 20 MG/1
20 TABLET ORAL DAILY
Qty: 30 TABLET | Refills: 3 | Status: SHIPPED | OUTPATIENT
Start: 2023-05-29 | End: 2024-05-28

## 2023-05-29 RX ORDER — TRIAMTERENE AND HYDROCHLOROTHIAZIDE 37.5; 25 MG/1; MG/1
1 CAPSULE ORAL EVERY MORNING
Qty: 90 CAPSULE | Refills: 3 | Status: SHIPPED | OUTPATIENT
Start: 2023-05-29 | End: 2023-09-07 | Stop reason: SDUPTHER

## 2023-05-29 RX ORDER — SERTRALINE HYDROCHLORIDE 25 MG/1
25 TABLET, FILM COATED ORAL DAILY
Qty: 30 TABLET | Refills: 2 | Status: SHIPPED | OUTPATIENT
Start: 2023-05-29 | End: 2023-09-13

## 2023-05-29 RX ORDER — LISINOPRIL 20 MG/1
20 TABLET ORAL 2 TIMES DAILY
Qty: 180 TABLET | Refills: 0 | Status: SHIPPED | OUTPATIENT
Start: 2023-05-29 | End: 2023-09-07 | Stop reason: SDUPTHER

## 2023-05-29 RX ORDER — PANTOPRAZOLE SODIUM 40 MG/1
40 TABLET, DELAYED RELEASE ORAL DAILY
Qty: 90 TABLET | Refills: 3 | Status: SHIPPED | OUTPATIENT
Start: 2023-05-29 | End: 2023-09-07 | Stop reason: SDUPTHER

## 2023-05-29 NOTE — PROGRESS NOTES
" Subjective:    Patient ID:  Lindsey Green is a 72 y.o. female patient here for evaluation Follow-up    History of Present Illness:   Pt here to follow up on stress results  Wants to try mild "water pill" to see if this will help swelling in legs  Trying to lose weight; walking frequently; has lost 2 lbs  Possible hormone imbalance: sweats and hot flashes  Has not had thyroid checked ; has no PCP  No CP or shortness of breath  She does not smoke or drink alcohol    Most Recent Echocardiogram Results  No results found for this or any previous visit.      Most Recent Nuclear Stress Test Results  Results for orders placed during the hospital encounter of 04/27/23    Nuclear Stress - Cardiology Interpreted    Interpretation Summary    Abnormal myocardial perfusion scan.  There is no reversible ischemia.    There is a moderate intensity, moderate sized, fixed perfusion abnormality consistent with scar in the basal to apical inferior and inferolateral wall(s) in the typical distribution of the RCA territory.    There are no other significant perfusion abnormalities.    There is a  moderate to severe intensity fixed perfusion abnormality in the inferior wall of the left ventricle secondary to diaphragm attenuation.    The gated perfusion images showed an ejection fraction of 47% at rest. The gated perfusion images showed an ejection fraction of 72% post stress. Normal ejection fraction is greater than 53%.    There is normal wall motion at rest and post stress.    LV cavity size is normal at rest and normal at stress.    The ECG portion of the study is negative for ischemia.    The patient reported no chest pain during the stress test.    There were no arrhythmias during stress.      Most Recent Cardiac PET Stress Test Results  No results found for this or any previous visit.      Most Recent Cardiovascular Angiogram results  No results found for this or any previous visit.      Other Most Recent Cardiology Results  No " results found for this or any previous visit.      REVIEW OF SYSTEMS: As noted in HPI   CARDIOVASCULAR: No recent chest pain, palpitations, arm, neck, or jaw pain.  RESPIRATORY: No recent fever, cough chills, SOB.  : No blood in the urine  GI: No nausea, vomiting, or blood in stool.   MUSCULOSKELETAL: No myalgias or falls.   NEURO: No syncope, lightheadedness, or dizziness.  EYES: No sudden changes in vision.     Past Medical History:   Diagnosis Date    Coronary artery disease involving native coronary artery 11/22/2016    Hypertension      Past Surgical History:   Procedure Laterality Date    ABDOMINAL SURGERY      hysterectomy    BREAST SURGERY      lumpectomy    COLON SURGERY      FRACTURE SURGERY      right wrist    HYSTERECTOMY       Social History     Tobacco Use    Smoking status: Never    Smokeless tobacco: Never   Substance Use Topics    Alcohol use: No    Drug use: No         Objective      Vitals:    05/29/23 0917   BP: 122/78   Pulse: 69   Resp: 16       LAST EKG  Results for orders placed or performed in visit on 05/23/22   IN OFFICE EKG 12-LEAD (to [place])    Collection Time: 05/23/22 10:33 AM    Narrative    Test Reason : I25.10,    Vent. Rate : 064 BPM     Atrial Rate : 064 BPM     P-R Int : 162 ms          QRS Dur : 082 ms      QT Int : 392 ms       P-R-T Axes : 091 035 010 degrees     QTc Int : 404 ms    Normal sinus rhythm  Low voltage QRS  Inferior infarct (cited on or before 21-NOV-2016)  Possible Anterolateral infarct ,age undetermined  Abnormal ECG  When compared with ECG of 23-NOV-2016 00:31,  Significant changes have occurred  Confirmed by Nuno Neri MD ([id number]) on 6/1/2022 9:09:03 AM    Referred By:             Confirmed By:Nuno Neri MD     LIPIDS - LAST 2   Lab Results   Component Value Date    CHOL 149 11/04/2022    CHOL 257 (H) 11/21/2016    HDL 78 (H) 11/04/2022    HDL 63 11/21/2016    LDLCALC 64.8 11/04/2022    LDLCALC 171.0 (H) 11/21/2016    TRIG 31 11/04/2022    TRIG 115  11/21/2016    CHOLHDL 52.3 (H) 11/04/2022    CHOLHDL 24.5 11/21/2016     CARDIAC PROFILE - LAST 2  Lab Results   Component Value Date    BNP 39 04/17/2023    TROPONINI 37.870 (H) 11/23/2016    TROPONINI 47.066 (H) 11/22/2016      CBC - LAST 2  Lab Results   Component Value Date    WBC 5.40 11/24/2016    WBC 8.00 11/23/2016    RBC 4.67 11/24/2016    RBC 4.85 11/23/2016    HGB 13.5 11/24/2016    HGB 14.0 11/23/2016    HCT 40.7 11/24/2016    HCT 42.3 11/23/2016     11/24/2016     11/23/2016     No results found for: LABPT, INR, APTT  CHEMISTRY - LAST 2  Lab Results   Component Value Date     04/17/2023     11/24/2016    K 4.2 04/17/2023    K 4.0 11/24/2016     04/17/2023     11/24/2016    CO2 31 (H) 04/17/2023    CO2 25 11/24/2016    ANIONGAP 9 04/17/2023    ANIONGAP 7 (L) 11/24/2016    BUN 20 04/17/2023    BUN 12 11/24/2016    CREATININE 1.5 (H) 04/17/2023    CREATININE 1.0 11/24/2016    GLU 77 04/17/2023    GLU 91 11/24/2016    CALCIUM 9.4 04/17/2023    CALCIUM 9.0 11/24/2016    MG 2.3 11/22/2016    ALBUMIN 4.2 11/04/2022    ALBUMIN 3.4 (L) 11/23/2016    PROT 7.0 11/04/2022    PROT 6.2 11/23/2016    ALKPHOS 55 11/04/2022    ALKPHOS 74 11/23/2016    ALT 16 11/04/2022    ALT 29 11/23/2016    AST 29 11/04/2022     (H) 11/23/2016    BILITOT 0.9 11/04/2022    BILITOT 0.6 11/23/2016      ENDOCRINE - LAST 2  Lab Results   Component Value Date    TSH 0.980 05/31/2021        PHYSICAL EXAM  CONSTITUTIONAL: Well built, well nourished in no apparent distress  NECK: no carotid bruit, no JVD  LUNGS: CTA  CHEST WALL: no tenderness  HEART: regular rate and rhythm, S1, S2 normal, no murmur, click, rub or gallop   ABDOMEN: soft, non-tender; bowel sounds normal; no masses,  no organomegaly  EXTREMITIES: Extremities normal, no edema, no calf tenderness noted  NEURO: AAO X 3    I HAVE REVIEWED :    The vital signs, most recent cardiac testing, and most recent pertinent non-cardiology provider  notes.    Current Outpatient Medications   Medication Instructions    aspirin (ECOTRIN) 81 mg, Oral, Daily    clopidogreL (PLAVIX) 75 mg tablet Take 1 tablet by mouth once daily    coenzyme Q10 200 mg, Oral, Daily    furosemide (LASIX) 20 mg, Oral, Daily    lisinopriL (PRINIVIL,ZESTRIL) 20 mg, Oral, 2 times daily    magnesium oxide (MAG-OX) 400 mg, Oral, Daily    metoprolol tartrate (LOPRESSOR) 25 MG tablet Take 1/2 (one-half) tablet by mouth twice daily Strength: 25 mg    pantoprazole (PROTONIX) 40 mg, Oral, Daily    potassium 99 mg Tab Oral, Once    rosuvastatin (CRESTOR) 20 mg, Oral, Daily    sertraline (ZOLOFT) 25 mg, Oral, Daily    triamterene-hydrochlorothiazide 37.5-25 mg (DYAZIDE) 37.5-25 mg per capsule 1 capsule, Oral, Every morning      Assessment & Plan     Anxiety  Stable    Coronary artery disease without angina pectoris  Pt has had no chest pain  Recent stress test was negative for reversible ischemia  Patient was to continue aspirin 81 mg daily, Plavix 70 mg daily, rosuvastatin 20 mg p.o. q.h.s., Co Q10 200 mg daily    Peripheral edema  Patient was nonpitting very mild edema to bilateral lower extremities  Bilateral feet are warm and pulses are palpable  There is no calf tenderness  Patient counseled on low-sodium diet  Added 20 mg of Lasix daily  Advised continue low-impact exercise and diet      Primary hypertension  Blood pressure is well controlled in the office today, 122/78 mm Hg.  She states it is well controlled at home as well.  Patient to continue lisinopril 20 mg p.o. b.i.d., metoprolol 25 mg tablet 1/2 tablet b.i.d. and triamterene-hydrochlorothiazide 37.525 mg once daily  Counseled on adherence to low-sodium diet and continue low-impact exercise  Ordered CMP to evaluate kidney function and electrolytes and liver enzymes    BMI 35.0-35.9,adult  Patient discussed struggles with weight loss.  She states she was walking regularly and trying to diet but she was only lost 2 lb.  She has no  primary care provider.  I advised her to follow up with the primary care provider as there are multiple medications on the market that can safely help her lose weight.  Voiced understanding    Also ordered thyroid panel to evaluate thyroid function    Dyslipidemia  Continue rosuvastatin 20 mg p.o. q.h.s. and adhere to a low-cholesterol diet.  Continue low-impact exercise.  Checking CMP to evaluate electrolytes, kidney function, and liver function   Latest Reference Range & Units Most Recent   LDL Cholesterol External 63.0 - 159.0 mg/dL 64.8  11/4/22 08:07

## 2023-05-29 NOTE — ASSESSMENT & PLAN NOTE
Pt has had no chest pain  Recent stress test was negative for reversible ischemia  Patient was to continue aspirin 81 mg daily, Plavix 70 mg daily, rosuvastatin 20 mg p.o. q.h.s., Co Q10 200 mg daily

## 2023-05-29 NOTE — ASSESSMENT & PLAN NOTE
Patient discussed struggles with weight loss.  She states she was walking regularly and trying to diet but she was only lost 2 lb.  She has no primary care provider.  I advised her to follow up with the primary care provider as there are multiple medications on the market that can safely help her lose weight.  Voiced understanding    Also ordered thyroid panel to evaluate thyroid function

## 2023-05-29 NOTE — ASSESSMENT & PLAN NOTE
Patient was nonpitting very mild edema to bilateral lower extremities  Bilateral feet are warm and pulses are palpable  There is no calf tenderness  Patient counseled on low-sodium diet  Added 20 mg of Lasix daily  Advised continue low-impact exercise and diet

## 2023-05-29 NOTE — ASSESSMENT & PLAN NOTE
Continue rosuvastatin 20 mg p.o. q.h.s. and adhere to a low-cholesterol diet.  Continue low-impact exercise.  Checking CMP to evaluate electrolytes, kidney function, and liver function   Latest Reference Range & Units Most Recent   LDL Cholesterol External 63.0 - 159.0 mg/dL 64.8  11/4/22 08:07

## 2023-05-29 NOTE — ASSESSMENT & PLAN NOTE
Blood pressure is well controlled in the office today, 122/78 mm Hg.  She states it is well controlled at home as well.  Patient to continue lisinopril 20 mg p.o. b.i.d., metoprolol 25 mg tablet 1/2 tablet b.i.d. and triamterene-hydrochlorothiazide 37.525 mg once daily  Counseled on adherence to low-sodium diet and continue low-impact exercise  Ordered CMP to evaluate kidney function and electrolytes and liver enzymes

## 2023-08-23 ENCOUNTER — TELEPHONE (OUTPATIENT)
Dept: CARDIOLOGY | Facility: CLINIC | Age: 73
End: 2023-08-23
Payer: MEDICARE

## 2023-08-23 NOTE — TELEPHONE ENCOUNTER
----- Message from Marcelo Kylah sent at 8/23/2023 10:39 AM CDT -----  Regarding: ret call  Contact: lindsey at [phone]  Type:  Patient Returning Call    Who Called:  Lindsey    Who Left Message for Patient:  Claire    Does the patient know what this is regarding?:  yes    Best Call Back Number:  [phone]    Additional Information:  checked schedule with CONOR Lira, CONOR Smith and Dr Neri. No appt avail before end of year. Please call to see if can fit in sooner. Please leave msg with dates avail if do not get pt.

## 2023-08-23 NOTE — TELEPHONE ENCOUNTER
----- Message from Chhaya Santana, Patient Care Assistant sent at 8/23/2023  2:06 PM CDT -----  Regarding: returning call  Contact: pt  Type:  Patient Returning Call    Who Called:  pt     Who Left Message for Patient:  Kiki Marin    Does the patient know what this is regarding?:  yes     Best Call Back Number:  [phone]    Additional Information:  please call pt to advise. Thanks!

## 2023-08-23 NOTE — TELEPHONE ENCOUNTER
----- Message from Ming Mojica sent at 8/23/2023  8:38 AM CDT -----  Regarding: appointment  Contact: patient  Type:  Sooner Apoointment Request    Caller is requesting a sooner appointment.  Caller declined first available appointment listed below.  Caller will not accept being placed on the waitlist and is requesting a message be sent to doctor.  Name of Caller:patient  When is the first available appointment?unable to make appointment today/ please call to schedule next appointment  Symptoms:check up  Would the patient rather a call back or a response via [facility]? call  Best Call Back Number:[phone]  Additional Information: patient requesting to be called if any consolations

## 2023-09-07 ENCOUNTER — LAB VISIT (OUTPATIENT)
Dept: LAB | Facility: HOSPITAL | Age: 73
End: 2023-09-07
Attending: NURSE PRACTITIONER
Payer: MEDICARE

## 2023-09-07 ENCOUNTER — OFFICE VISIT (OUTPATIENT)
Dept: CARDIOLOGY | Facility: CLINIC | Age: 73
End: 2023-09-07
Payer: MEDICARE

## 2023-09-07 VITALS
BODY MASS INDEX: 35.47 KG/M2 | SYSTOLIC BLOOD PRESSURE: 130 MMHG | DIASTOLIC BLOOD PRESSURE: 80 MMHG | WEIGHT: 226 LBS | HEART RATE: 59 BPM | HEIGHT: 67 IN | OXYGEN SATURATION: 98 %

## 2023-09-07 DIAGNOSIS — F41.9 ANXIETY: ICD-10-CM

## 2023-09-07 DIAGNOSIS — R53.83 FATIGUE, UNSPECIFIED TYPE: Primary | ICD-10-CM

## 2023-09-07 DIAGNOSIS — R53.83 FATIGUE, UNSPECIFIED TYPE: ICD-10-CM

## 2023-09-07 DIAGNOSIS — I10 HYPERTENSION, UNSPECIFIED TYPE: ICD-10-CM

## 2023-09-07 DIAGNOSIS — I25.119 CORONARY ARTERY DISEASE INVOLVING NATIVE CORONARY ARTERY OF NATIVE HEART WITH ANGINA PECTORIS: ICD-10-CM

## 2023-09-07 DIAGNOSIS — Z13.29 SCREENING FOR THYROID DISORDER: ICD-10-CM

## 2023-09-07 DIAGNOSIS — E78.5 DYSLIPIDEMIA: ICD-10-CM

## 2023-09-07 DIAGNOSIS — N18.32 STAGE 3B CHRONIC KIDNEY DISEASE: ICD-10-CM

## 2023-09-07 LAB
ALBUMIN SERPL BCP-MCNC: 4.1 G/DL (ref 3.5–5.2)
ALP SERPL-CCNC: 57 U/L (ref 55–135)
ALT SERPL W/O P-5'-P-CCNC: 19 U/L (ref 10–44)
ANION GAP SERPL CALC-SCNC: 3 MMOL/L (ref 8–16)
ANION GAP SERPL CALC-SCNC: 3 MMOL/L (ref 8–16)
AST SERPL-CCNC: 30 U/L (ref 10–40)
BILIRUB SERPL-MCNC: 1 MG/DL (ref 0.1–1)
BUN SERPL-MCNC: 16 MG/DL (ref 8–23)
BUN SERPL-MCNC: 16 MG/DL (ref 8–23)
CALCIUM SERPL-MCNC: 9.8 MG/DL (ref 8.7–10.5)
CALCIUM SERPL-MCNC: 9.8 MG/DL (ref 8.7–10.5)
CHLORIDE SERPL-SCNC: 105 MMOL/L (ref 95–110)
CHLORIDE SERPL-SCNC: 105 MMOL/L (ref 95–110)
CO2 SERPL-SCNC: 29 MMOL/L (ref 23–29)
CO2 SERPL-SCNC: 29 MMOL/L (ref 23–29)
CREAT SERPL-MCNC: 1.4 MG/DL (ref 0.5–1.4)
CREAT SERPL-MCNC: 1.4 MG/DL (ref 0.5–1.4)
EST. GFR  (NO RACE VARIABLE): 39.7 ML/MIN/1.73 M^2
EST. GFR  (NO RACE VARIABLE): 39.7 ML/MIN/1.73 M^2
GLUCOSE SERPL-MCNC: 93 MG/DL (ref 70–110)
GLUCOSE SERPL-MCNC: 93 MG/DL (ref 70–110)
POTASSIUM SERPL-SCNC: 4.1 MMOL/L (ref 3.5–5.1)
POTASSIUM SERPL-SCNC: 4.1 MMOL/L (ref 3.5–5.1)
PROT SERPL-MCNC: 6.9 G/DL (ref 6–8.4)
SODIUM SERPL-SCNC: 137 MMOL/L (ref 136–145)
SODIUM SERPL-SCNC: 137 MMOL/L (ref 136–145)
T4 FREE SERPL-MCNC: 0.71 NG/DL (ref 0.71–1.51)
TSH SERPL DL<=0.005 MIU/L-ACNC: 1.18 UIU/ML (ref 0.34–5.6)
TSH SERPL DL<=0.005 MIU/L-ACNC: 1.18 UIU/ML (ref 0.34–5.6)

## 2023-09-07 PROCEDURE — 99999 PR PBB SHADOW E&M-EST. PATIENT-LVL III: CPT | Mod: PBBFAC,,, | Performed by: NURSE PRACTITIONER

## 2023-09-07 PROCEDURE — 4010F ACE/ARB THERAPY RXD/TAKEN: CPT | Mod: CPTII,S$GLB,, | Performed by: NURSE PRACTITIONER

## 2023-09-07 PROCEDURE — 3075F PR MOST RECENT SYSTOLIC BLOOD PRESS GE 130-139MM HG: ICD-10-PCS | Mod: CPTII,S$GLB,, | Performed by: NURSE PRACTITIONER

## 2023-09-07 PROCEDURE — 3008F PR BODY MASS INDEX (BMI) DOCUMENTED: ICD-10-PCS | Mod: CPTII,S$GLB,, | Performed by: NURSE PRACTITIONER

## 2023-09-07 PROCEDURE — 3075F SYST BP GE 130 - 139MM HG: CPT | Mod: CPTII,S$GLB,, | Performed by: NURSE PRACTITIONER

## 2023-09-07 PROCEDURE — 3288F FALL RISK ASSESSMENT DOCD: CPT | Mod: CPTII,S$GLB,, | Performed by: NURSE PRACTITIONER

## 2023-09-07 PROCEDURE — 1159F PR MEDICATION LIST DOCUMENTED IN MEDICAL RECORD: ICD-10-PCS | Mod: CPTII,S$GLB,, | Performed by: NURSE PRACTITIONER

## 2023-09-07 PROCEDURE — 99213 PR OFFICE/OUTPT VISIT, EST, LEVL III, 20-29 MIN: ICD-10-PCS | Mod: S$GLB,,, | Performed by: NURSE PRACTITIONER

## 2023-09-07 PROCEDURE — 3079F PR MOST RECENT DIASTOLIC BLOOD PRESSURE 80-89 MM HG: ICD-10-PCS | Mod: CPTII,S$GLB,, | Performed by: NURSE PRACTITIONER

## 2023-09-07 PROCEDURE — 3008F BODY MASS INDEX DOCD: CPT | Mod: CPTII,S$GLB,, | Performed by: NURSE PRACTITIONER

## 2023-09-07 PROCEDURE — 1159F MED LIST DOCD IN RCRD: CPT | Mod: CPTII,S$GLB,, | Performed by: NURSE PRACTITIONER

## 2023-09-07 PROCEDURE — 84443 ASSAY THYROID STIM HORMONE: CPT | Performed by: NURSE PRACTITIONER

## 2023-09-07 PROCEDURE — 36415 COLL VENOUS BLD VENIPUNCTURE: CPT | Performed by: NURSE PRACTITIONER

## 2023-09-07 PROCEDURE — 1126F PR PAIN SEVERITY QUANTIFIED, NO PAIN PRESENT: ICD-10-PCS | Mod: CPTII,S$GLB,, | Performed by: NURSE PRACTITIONER

## 2023-09-07 PROCEDURE — 99213 OFFICE O/P EST LOW 20 MIN: CPT | Mod: S$GLB,,, | Performed by: NURSE PRACTITIONER

## 2023-09-07 PROCEDURE — 4010F PR ACE/ARB THEARPY RXD/TAKEN: ICD-10-PCS | Mod: CPTII,S$GLB,, | Performed by: NURSE PRACTITIONER

## 2023-09-07 PROCEDURE — 84439 ASSAY OF FREE THYROXINE: CPT | Performed by: NURSE PRACTITIONER

## 2023-09-07 PROCEDURE — 1126F AMNT PAIN NOTED NONE PRSNT: CPT | Mod: CPTII,S$GLB,, | Performed by: NURSE PRACTITIONER

## 2023-09-07 PROCEDURE — 3079F DIAST BP 80-89 MM HG: CPT | Mod: CPTII,S$GLB,, | Performed by: NURSE PRACTITIONER

## 2023-09-07 PROCEDURE — 99999 PR PBB SHADOW E&M-EST. PATIENT-LVL III: ICD-10-PCS | Mod: PBBFAC,,, | Performed by: NURSE PRACTITIONER

## 2023-09-07 PROCEDURE — 1101F PT FALLS ASSESS-DOCD LE1/YR: CPT | Mod: CPTII,S$GLB,, | Performed by: NURSE PRACTITIONER

## 2023-09-07 PROCEDURE — 3288F PR FALLS RISK ASSESSMENT DOCUMENTED: ICD-10-PCS | Mod: CPTII,S$GLB,, | Performed by: NURSE PRACTITIONER

## 2023-09-07 PROCEDURE — 1101F PR PT FALLS ASSESS DOC 0-1 FALLS W/OUT INJ PAST YR: ICD-10-PCS | Mod: CPTII,S$GLB,, | Performed by: NURSE PRACTITIONER

## 2023-09-07 PROCEDURE — 80053 COMPREHEN METABOLIC PANEL: CPT | Performed by: NURSE PRACTITIONER

## 2023-09-07 RX ORDER — METOPROLOL TARTRATE 25 MG/1
TABLET, FILM COATED ORAL
Qty: 90 TABLET | Refills: 3 | Status: SHIPPED | OUTPATIENT
Start: 2023-09-07

## 2023-09-07 RX ORDER — CLOPIDOGREL BISULFATE 75 MG/1
75 TABLET ORAL DAILY
Qty: 90 TABLET | Refills: 3 | Status: SHIPPED | OUTPATIENT
Start: 2023-09-07

## 2023-09-07 RX ORDER — PANTOPRAZOLE SODIUM 40 MG/1
40 TABLET, DELAYED RELEASE ORAL DAILY
Qty: 90 TABLET | Refills: 3 | Status: SHIPPED | OUTPATIENT
Start: 2023-09-07

## 2023-09-07 RX ORDER — TRIAMTERENE AND HYDROCHLOROTHIAZIDE 37.5; 25 MG/1; MG/1
1 CAPSULE ORAL EVERY MORNING
Qty: 90 CAPSULE | Refills: 3 | Status: SHIPPED | OUTPATIENT
Start: 2023-09-07

## 2023-09-07 RX ORDER — LISINOPRIL 20 MG/1
20 TABLET ORAL 2 TIMES DAILY
Qty: 180 TABLET | Refills: 3 | Status: SHIPPED | OUTPATIENT
Start: 2023-09-07

## 2023-09-07 RX ORDER — ROSUVASTATIN CALCIUM 20 MG/1
20 TABLET, COATED ORAL DAILY
Qty: 90 TABLET | Refills: 3 | Status: SHIPPED | OUTPATIENT
Start: 2023-09-07

## 2023-09-07 NOTE — PROGRESS NOTES
" Subjective:    Patient ID:  Lindsey Green is a 72 y.o. female patient here for evaluation Follow-up    History of Present Illness:     3 mo follow up; needs refills  Denies CP, shortness of breath, syncope, palpitations, PND, orthopnea; No h/a or dizziness  Going to gym TIW, rides bike  Feels heat is making her more fatigued  Tried prn lasix for leg swelling but caused her to feel "whipped out"  No bleeding tendencies from plavix + aspirin  Checking BP at home and it numbers are running controlled  Stress test below    Most Recent Echocardiogram Results  No results found for this or any previous visit.      Most Recent Nuclear Stress Test Results  Results for orders placed during the hospital encounter of 04/27/23    Nuclear Stress - Cardiology Interpreted    Interpretation Summary    Abnormal myocardial perfusion scan.  There is no reversible ischemia.    There is a moderate intensity, moderate sized, fixed perfusion abnormality consistent with scar in the basal to apical inferior and inferolateral wall(s) in the typical distribution of the RCA territory.    There are no other significant perfusion abnormalities.    There is a  moderate to severe intensity fixed perfusion abnormality in the inferior wall of the left ventricle secondary to diaphragm attenuation.    The gated perfusion images showed an ejection fraction of 47% at rest. The gated perfusion images showed an ejection fraction of 72% post stress. Normal ejection fraction is greater than 53%.    There is normal wall motion at rest and post stress.    LV cavity size is normal at rest and normal at stress.    The ECG portion of the study is negative for ischemia.    The patient reported no chest pain during the stress test.    There were no arrhythmias during stress.      Most Recent Cardiac PET Stress Test Results  No results found for this or any previous visit.      Most Recent Cardiovascular Angiogram results  No results found for this or any " previous visit.      Other Most Recent Cardiology Results  No results found for this or any previous visit.      REVIEW OF SYSTEMS: As noted in HPI   CARDIOVASCULAR: No recent chest pain, palpitations, arm/neck/jaw pain, or edema.  RESPIRATORY: No recent fever, cough, SOB.  : No blood in the urine  GI: No reflux, nausea, vomiting, or blood in stool.   MUSCULOSKELETAL: No falls.   NEURO: No headaches, syncope, or dizziness.  EYES: No sudden changes in vision.     Past Medical History:   Diagnosis Date    Coronary artery disease involving native coronary artery 11/22/2016    Hypertension      Past Surgical History:   Procedure Laterality Date    ABDOMINAL SURGERY      hysterectomy    BREAST SURGERY      lumpectomy    COLON SURGERY      FRACTURE SURGERY      right wrist    HYSTERECTOMY       Social History     Tobacco Use    Smoking status: Never    Smokeless tobacco: Never   Substance Use Topics    Alcohol use: No    Drug use: No         Objective      Vitals:    09/07/23 0819   BP: 130/80   Pulse: (!) 59       LAST EKG  Results for orders placed or performed in visit on 05/23/22   IN OFFICE EKG 12-LEAD (to [place])    Collection Time: 05/23/22 10:33 AM    Narrative    Test Reason : I25.10,    Vent. Rate : 064 BPM     Atrial Rate : 064 BPM     P-R Int : 162 ms          QRS Dur : 082 ms      QT Int : 392 ms       P-R-T Axes : 091 035 010 degrees     QTc Int : 404 ms    Normal sinus rhythm  Low voltage QRS  Inferior infarct (cited on or before 21-NOV-2016)  Possible Anterolateral infarct ,age undetermined  Abnormal ECG  When compared with ECG of 23-NOV-2016 00:31,  Significant changes have occurred  Confirmed by Nuno Neri MD ([id number]) on 6/1/2022 9:09:03 AM    Referred By:             Confirmed By:Nuno Neri MD     LIPIDS - LAST 2   Lab Results   Component Value Date    CHOL 149 11/04/2022    CHOL 257 (H) 11/21/2016    HDL 78 (H) 11/04/2022    HDL 63 11/21/2016    LDLCALC 64.8 11/04/2022    LDLCALC 171.0 (H)  "11/21/2016    TRIG 31 11/04/2022    TRIG 115 11/21/2016    CHOLHDL 52.3 (H) 11/04/2022    CHOLHDL 24.5 11/21/2016     CARDIAC PROFILE - LAST 2  Lab Results   Component Value Date    BNP 39 04/17/2023    TROPONINI 37.870 (H) 11/23/2016    TROPONINI 47.066 (H) 11/22/2016      CBC - LAST 2  Lab Results   Component Value Date    WBC 5.40 11/24/2016    WBC 8.00 11/23/2016    RBC 4.67 11/24/2016    RBC 4.85 11/23/2016    HGB 13.5 11/24/2016    HGB 14.0 11/23/2016    HCT 40.7 11/24/2016    HCT 42.3 11/23/2016     11/24/2016     11/23/2016     No results found for: "LABPT", "INR", "APTT"  CHEMISTRY - LAST 2  Lab Results   Component Value Date     04/17/2023     11/24/2016    K 4.2 04/17/2023    K 4.0 11/24/2016     04/17/2023     11/24/2016    CO2 31 (H) 04/17/2023    CO2 25 11/24/2016    ANIONGAP 9 04/17/2023    ANIONGAP 7 (L) 11/24/2016    BUN 20 04/17/2023    BUN 12 11/24/2016    CREATININE 1.5 (H) 04/17/2023    CREATININE 1.0 11/24/2016    GLU 77 04/17/2023    GLU 91 11/24/2016    CALCIUM 9.4 04/17/2023    CALCIUM 9.0 11/24/2016    MG 2.3 11/22/2016    ALBUMIN 4.2 11/04/2022    ALBUMIN 3.4 (L) 11/23/2016    PROT 7.0 11/04/2022    PROT 6.2 11/23/2016    ALKPHOS 55 11/04/2022    ALKPHOS 74 11/23/2016    ALT 16 11/04/2022    ALT 29 11/23/2016    AST 29 11/04/2022     (H) 11/23/2016    BILITOT 0.9 11/04/2022    BILITOT 0.6 11/23/2016      ENDOCRINE - LAST 2  Lab Results   Component Value Date    TSH 0.980 05/31/2021        PHYSICAL EXAM  CONSTITUTIONAL: Well built, well nourished elderly female breathing comfortably in no apparent distress  NECK: no carotid bruit, no JVD  LUNGS: CTA  CHEST WALL: no tenderness  HEART: regular rate and rhythm, S1, S2 normal, no murmur, click, rub or gallop   ABDOMEN: soft, non-tender; bowel sounds normal; no masses,  no organomegaly  EXTREMITIES: Extremities normal, no edema, no calf tenderness noted  NEURO: AAO X 3    I HAVE REVIEWED :    The " vital signs, most recent cardiac testing, and most recent pertinent non-cardiology provider notes.    Current Outpatient Medications   Medication Instructions    aspirin (ECOTRIN) 81 mg, Oral, Daily    clopidogreL (PLAVIX) 75 mg, Oral, Daily    coenzyme Q10 200 mg, Oral, Daily    furosemide (LASIX) 20 mg, Oral, Daily    lisinopriL (PRINIVIL,ZESTRIL) 20 mg, Oral, 2 times daily    magnesium oxide (MAG-OX) 400 mg, Oral, Daily    metoprolol tartrate (LOPRESSOR) 25 MG tablet Take 1/2 (one-half) tablet by mouth twice daily Strength: 25 mg    pantoprazole (PROTONIX) 40 mg, Oral, Daily    potassium 99 mg Tab Oral, Once    rosuvastatin (CRESTOR) 20 mg, Oral, Daily    sertraline (ZOLOFT) 25 mg, Oral, Daily    triamterene-hydrochlorothiazide 37.5-25 mg (DYAZIDE) 37.5-25 mg per capsule 1 capsule, Oral, Every morning      Assessment & Plan     Dyslipidemia  Continue Low impact exercise  Low cholesterol diet - print out given    Coronary artery disease involving native coronary artery of native heart with angina pectoris  Continue crestor and CoQ 10 mg daily and heart healthy diet  Continue aspirin, plavix and metoprolol as directed    BMI 35.0-35.9,adult  Weight loss promotion    Anxiety  Feels this is controlled      Labs ordered  Check kidneys, TSH  Refills sent

## 2023-09-07 NOTE — ASSESSMENT & PLAN NOTE
Continue crestor and CoQ 10 mg daily and heart healthy diet  Continue aspirin, plavix and metoprolol as directed

## 2023-10-09 RX ORDER — SERTRALINE HYDROCHLORIDE 25 MG/1
25 TABLET, FILM COATED ORAL
Qty: 30 TABLET | Refills: 0 | Status: SHIPPED | OUTPATIENT
Start: 2023-10-09 | End: 2023-11-13

## 2023-11-13 RX ORDER — SERTRALINE HYDROCHLORIDE 25 MG/1
25 TABLET, FILM COATED ORAL
Qty: 30 TABLET | Refills: 0 | Status: SHIPPED | OUTPATIENT
Start: 2023-11-13 | End: 2023-12-08

## 2023-11-15 ENCOUNTER — OFFICE VISIT (OUTPATIENT)
Dept: CARDIOLOGY | Facility: CLINIC | Age: 73
End: 2023-11-15
Payer: MEDICARE

## 2023-11-15 VITALS
DIASTOLIC BLOOD PRESSURE: 73 MMHG | HEART RATE: 62 BPM | BODY MASS INDEX: 35.44 KG/M2 | OXYGEN SATURATION: 97 % | WEIGHT: 226.31 LBS | SYSTOLIC BLOOD PRESSURE: 131 MMHG

## 2023-11-15 DIAGNOSIS — I25.2 HISTORY OF ST ELEVATION MYOCARDIAL INFARCTION (STEMI): ICD-10-CM

## 2023-11-15 DIAGNOSIS — I10 PRIMARY HYPERTENSION: ICD-10-CM

## 2023-11-15 DIAGNOSIS — E78.5 DYSLIPIDEMIA: ICD-10-CM

## 2023-11-15 DIAGNOSIS — I25.119 CORONARY ARTERY DISEASE INVOLVING NATIVE CORONARY ARTERY OF NATIVE HEART WITH ANGINA PECTORIS: Primary | ICD-10-CM

## 2023-11-15 PROCEDURE — 3008F PR BODY MASS INDEX (BMI) DOCUMENTED: ICD-10-PCS | Mod: CPTII,S$GLB,, | Performed by: NURSE PRACTITIONER

## 2023-11-15 PROCEDURE — 1159F MED LIST DOCD IN RCRD: CPT | Mod: CPTII,S$GLB,, | Performed by: NURSE PRACTITIONER

## 2023-11-15 PROCEDURE — 99999 PR PBB SHADOW E&M-EST. PATIENT-LVL III: ICD-10-PCS | Mod: PBBFAC,,, | Performed by: NURSE PRACTITIONER

## 2023-11-15 PROCEDURE — 4010F PR ACE/ARB THEARPY RXD/TAKEN: ICD-10-PCS | Mod: CPTII,S$GLB,, | Performed by: NURSE PRACTITIONER

## 2023-11-15 PROCEDURE — 3078F DIAST BP <80 MM HG: CPT | Mod: CPTII,S$GLB,, | Performed by: NURSE PRACTITIONER

## 2023-11-15 PROCEDURE — 3078F PR MOST RECENT DIASTOLIC BLOOD PRESSURE < 80 MM HG: ICD-10-PCS | Mod: CPTII,S$GLB,, | Performed by: NURSE PRACTITIONER

## 2023-11-15 PROCEDURE — 99999 PR PBB SHADOW E&M-EST. PATIENT-LVL III: CPT | Mod: PBBFAC,,, | Performed by: NURSE PRACTITIONER

## 2023-11-15 PROCEDURE — 3075F PR MOST RECENT SYSTOLIC BLOOD PRESS GE 130-139MM HG: ICD-10-PCS | Mod: CPTII,S$GLB,, | Performed by: NURSE PRACTITIONER

## 2023-11-15 PROCEDURE — 1101F PT FALLS ASSESS-DOCD LE1/YR: CPT | Mod: CPTII,S$GLB,, | Performed by: NURSE PRACTITIONER

## 2023-11-15 PROCEDURE — 1159F PR MEDICATION LIST DOCUMENTED IN MEDICAL RECORD: ICD-10-PCS | Mod: CPTII,S$GLB,, | Performed by: NURSE PRACTITIONER

## 2023-11-15 PROCEDURE — 3008F BODY MASS INDEX DOCD: CPT | Mod: CPTII,S$GLB,, | Performed by: NURSE PRACTITIONER

## 2023-11-15 PROCEDURE — 1101F PR PT FALLS ASSESS DOC 0-1 FALLS W/OUT INJ PAST YR: ICD-10-PCS | Mod: CPTII,S$GLB,, | Performed by: NURSE PRACTITIONER

## 2023-11-15 PROCEDURE — 99214 PR OFFICE/OUTPT VISIT, EST, LEVL IV, 30-39 MIN: ICD-10-PCS | Mod: S$GLB,,, | Performed by: NURSE PRACTITIONER

## 2023-11-15 PROCEDURE — 3075F SYST BP GE 130 - 139MM HG: CPT | Mod: CPTII,S$GLB,, | Performed by: NURSE PRACTITIONER

## 2023-11-15 PROCEDURE — 4010F ACE/ARB THERAPY RXD/TAKEN: CPT | Mod: CPTII,S$GLB,, | Performed by: NURSE PRACTITIONER

## 2023-11-15 PROCEDURE — 3288F PR FALLS RISK ASSESSMENT DOCUMENTED: ICD-10-PCS | Mod: CPTII,S$GLB,, | Performed by: NURSE PRACTITIONER

## 2023-11-15 PROCEDURE — 99214 OFFICE O/P EST MOD 30 MIN: CPT | Mod: S$GLB,,, | Performed by: NURSE PRACTITIONER

## 2023-11-15 PROCEDURE — 3288F FALL RISK ASSESSMENT DOCD: CPT | Mod: CPTII,S$GLB,, | Performed by: NURSE PRACTITIONER

## 2023-11-15 NOTE — PROGRESS NOTES
Subjective:    Patient ID:  Lindsey Green is a 72 y.o. female       HPI:  Patient presents today for follow-up appointment.  Patient has no complaints of chest pain, dizziness, shortness of breath, palpitations, or syncope.  Patient has been doing well and taking medications as ordered.  Denies any falls or head injuries.  Denies any blood in the stool or in the urine.      Review of patient's allergies indicates:   Allergen Reactions    Pcn [penicillins]        Past Medical History:   Diagnosis Date    Coronary artery disease involving native coronary artery 11/22/2016    Hypertension      Past Surgical History:   Procedure Laterality Date    ABDOMINAL SURGERY      hysterectomy    BREAST SURGERY      lumpectomy    COLON SURGERY      FRACTURE SURGERY      right wrist    HYSTERECTOMY       Social History     Tobacco Use    Smoking status: Never    Smokeless tobacco: Never   Substance Use Topics    Alcohol use: No    Drug use: No     Family History   Problem Relation Age of Onset    No Known Problems Sister     COPD Brother         Review of Systems:   Per HPI         Objective:        Vitals:    11/15/23 0901   BP: 131/73   Pulse: 62       Lab Results   Component Value Date    WBC 5.40 11/24/2016    HGB 13.5 11/24/2016    HCT 40.7 11/24/2016     11/24/2016    CHOL 149 11/04/2022    TRIG 31 11/04/2022    HDL 78 (H) 11/04/2022    ALT 19 09/07/2023    AST 30 09/07/2023     09/07/2023     09/07/2023    K 4.1 09/07/2023    K 4.1 09/07/2023     09/07/2023     09/07/2023    CREATININE 1.4 09/07/2023    CREATININE 1.4 09/07/2023    BUN 16 09/07/2023    BUN 16 09/07/2023    CO2 29 09/07/2023    CO2 29 09/07/2023    TSH 1.180 09/07/2023    TSH 1.180 09/07/2023        ECHOCARDIOGRAM RESULTS  No results found for this or any previous visit.        CURRENT/PREVIOUS VISIT EKG  Results for orders placed or performed in visit on 05/23/22   IN OFFICE EKG 12-LEAD (to [place])    Collection Time: 05/23/22  10:33 AM    Narrative    Test Reason : I25.10,    Vent. Rate : 064 BPM     Atrial Rate : 064 BPM     P-R Int : 162 ms          QRS Dur : 082 ms      QT Int : 392 ms       P-R-T Axes : 091 035 010 degrees     QTc Int : 404 ms    Normal sinus rhythm  Low voltage QRS  Inferior infarct (cited on or before 21-NOV-2016)  Possible Anterolateral infarct ,age undetermined  Abnormal ECG  When compared with ECG of 23-NOV-2016 00:31,  Significant changes have occurred  Confirmed by Nuno Neri MD ([id number]) on 6/1/2022 9:09:03 AM    Referred By:             Confirmed By:Nuno Neri MD     No valid procedures specified.   Results for orders placed during the hospital encounter of 04/27/23    Nuclear Stress - Cardiology Interpreted    Interpretation Summary    Abnormal myocardial perfusion scan.  There is no reversible ischemia.    There is a moderate intensity, moderate sized, fixed perfusion abnormality consistent with scar in the basal to apical inferior and inferolateral wall(s) in the typical distribution of the RCA territory.    There are no other significant perfusion abnormalities.    There is a  moderate to severe intensity fixed perfusion abnormality in the inferior wall of the left ventricle secondary to diaphragm attenuation.    The gated perfusion images showed an ejection fraction of 47% at rest. The gated perfusion images showed an ejection fraction of 72% post stress. Normal ejection fraction is greater than 53%.    There is normal wall motion at rest and post stress.    LV cavity size is normal at rest and normal at stress.    The ECG portion of the study is negative for ischemia.    The patient reported no chest pain during the stress test.    There were no arrhythmias during stress.      Physical Exam:  CONSTITUTIONAL: No fever, no chills  HEENT: Normocephalic, atraumatic,pupils reactive to light                 NECK:  No JVD no carotid bruit  CVS: S1S2+, RRR  LUNGS: Clear  ABDOMEN: Soft, NT,  BS+  EXTREMITIES: No cyanosis, edema  : No branham catheter  NEURO: AAO X 3  PSY: Normal affect      Medication List with Changes/Refills   Current Medications    ASPIRIN (ECOTRIN) 81 MG EC TABLET    Take 1 tablet (81 mg total) by mouth once daily.    CLOPIDOGREL (PLAVIX) 75 MG TABLET    Take 1 tablet (75 mg total) by mouth once daily.    COENZYME Q10 200 MG CAPSULE    Take 200 mg by mouth once daily.    FUROSEMIDE (LASIX) 20 MG TABLET    Take 1 tablet (20 mg total) by mouth once daily.    LISINOPRIL (PRINIVIL,ZESTRIL) 20 MG TABLET    Take 1 tablet (20 mg total) by mouth 2 (two) times daily.    METOPROLOL TARTRATE (LOPRESSOR) 25 MG TABLET    Take 1/2 (one-half) tablet by mouth twice daily Strength: 25 mg    PANTOPRAZOLE (PROTONIX) 40 MG TABLET    Take 1 tablet (40 mg total) by mouth once daily.    POTASSIUM 99 MG TAB    Take by mouth once.    ROSUVASTATIN (CRESTOR) 20 MG TABLET    Take 1 tablet (20 mg total) by mouth once daily.    SERTRALINE (ZOLOFT) 25 MG TABLET    Take 1 tablet by mouth once daily    TRIAMTERENE-HYDROCHLOROTHIAZIDE 37.5-25 MG (DYAZIDE) 37.5-25 MG PER CAPSULE    Take 1 capsule by mouth every morning.             Assessment:       1. Coronary artery disease involving native coronary artery of native heart with angina pectoris    2. Dyslipidemia    3. Primary hypertension    4. History of ST elevation myocardial infarction (STEMI)         Plan:     Problem List Items Addressed This Visit          Unprioritized    History of ST elevation myocardial infarction (STEMI)    Primary hypertension    Overview     Chronic problem. Will continue chronic medications and monitor for any changes, adjusting as needed.           Current Assessment & Plan     Bp stable on current regimen. Continue the same.          Coronary artery disease involving native coronary artery of native heart with angina pectoris - Primary    Current Assessment & Plan     Asymptomatic and stable on current regimen.   LDL is at goal.    Recommend low fat low cholesterol diet and regular exercise.   Maintains on Plavix 75 mg po daily and aspirin 81 mg po daily.          Dyslipidemia    Current Assessment & Plan     Continue rosuvastatin 20 mg po daily.               Follow up in about 6 months (around 5/15/2024).

## 2023-11-15 NOTE — ASSESSMENT & PLAN NOTE
Asymptomatic and stable on current regimen.   LDL is at goal.   Recommend low fat low cholesterol diet and regular exercise.   Maintains on Plavix 75 mg po daily and aspirin 81 mg po daily.

## 2023-12-08 RX ORDER — SERTRALINE HYDROCHLORIDE 25 MG/1
25 TABLET, FILM COATED ORAL
Qty: 30 TABLET | Refills: 0 | Status: SHIPPED | OUTPATIENT
Start: 2023-12-08 | End: 2024-01-09

## 2024-01-09 RX ORDER — SERTRALINE HYDROCHLORIDE 25 MG/1
25 TABLET, FILM COATED ORAL
Qty: 30 TABLET | Refills: 0 | Status: SHIPPED | OUTPATIENT
Start: 2024-01-09 | End: 2024-02-06

## 2024-02-06 RX ORDER — SERTRALINE HYDROCHLORIDE 25 MG/1
25 TABLET, FILM COATED ORAL
Qty: 30 TABLET | Refills: 0 | Status: SHIPPED | OUTPATIENT
Start: 2024-02-06 | End: 2024-03-06

## 2024-03-06 RX ORDER — SERTRALINE HYDROCHLORIDE 25 MG/1
25 TABLET, FILM COATED ORAL
Qty: 90 TABLET | Refills: 3 | Status: SHIPPED | OUTPATIENT
Start: 2024-03-06

## 2024-05-10 ENCOUNTER — OFFICE VISIT (OUTPATIENT)
Dept: CARDIOLOGY | Facility: CLINIC | Age: 74
End: 2024-05-10
Payer: MEDICARE

## 2024-05-10 VITALS
HEIGHT: 67 IN | BODY MASS INDEX: 34.95 KG/M2 | WEIGHT: 222.69 LBS | HEART RATE: 57 BPM | DIASTOLIC BLOOD PRESSURE: 71 MMHG | SYSTOLIC BLOOD PRESSURE: 124 MMHG

## 2024-05-10 DIAGNOSIS — E78.5 DYSLIPIDEMIA: ICD-10-CM

## 2024-05-10 DIAGNOSIS — I10 PRIMARY HYPERTENSION: ICD-10-CM

## 2024-05-10 DIAGNOSIS — I15.8 OTHER SECONDARY HYPERTENSION: Primary | ICD-10-CM

## 2024-05-10 DIAGNOSIS — I25.119 CORONARY ARTERY DISEASE INVOLVING NATIVE CORONARY ARTERY OF NATIVE HEART WITH ANGINA PECTORIS: ICD-10-CM

## 2024-05-10 DIAGNOSIS — N18.32 STAGE 3B CHRONIC KIDNEY DISEASE: ICD-10-CM

## 2024-05-10 PROCEDURE — 3074F SYST BP LT 130 MM HG: CPT | Mod: CPTII,S$GLB,, | Performed by: NURSE PRACTITIONER

## 2024-05-10 PROCEDURE — 3288F FALL RISK ASSESSMENT DOCD: CPT | Mod: CPTII,S$GLB,, | Performed by: NURSE PRACTITIONER

## 2024-05-10 PROCEDURE — 1101F PT FALLS ASSESS-DOCD LE1/YR: CPT | Mod: CPTII,S$GLB,, | Performed by: NURSE PRACTITIONER

## 2024-05-10 PROCEDURE — 99214 OFFICE O/P EST MOD 30 MIN: CPT | Mod: S$GLB,,, | Performed by: NURSE PRACTITIONER

## 2024-05-10 PROCEDURE — 1159F MED LIST DOCD IN RCRD: CPT | Mod: CPTII,S$GLB,, | Performed by: NURSE PRACTITIONER

## 2024-05-10 PROCEDURE — 99999 PR PBB SHADOW E&M-EST. PATIENT-LVL III: CPT | Mod: PBBFAC,,, | Performed by: NURSE PRACTITIONER

## 2024-05-10 PROCEDURE — 3078F DIAST BP <80 MM HG: CPT | Mod: CPTII,S$GLB,, | Performed by: NURSE PRACTITIONER

## 2024-05-10 PROCEDURE — 4010F ACE/ARB THERAPY RXD/TAKEN: CPT | Mod: CPTII,S$GLB,, | Performed by: NURSE PRACTITIONER

## 2024-05-10 RX ORDER — LANOLIN ALCOHOL/MO/W.PET/CERES
400 CREAM (GRAM) TOPICAL DAILY
Qty: 90 TABLET | Refills: 3 | Status: SHIPPED | OUTPATIENT
Start: 2024-05-10

## 2024-05-10 RX ORDER — CALCIUM CARB, CITRATE, MALATE 250 MG
99 CAPSULE ORAL DAILY
Qty: 90 CAPSULE | Refills: 3 | Status: SHIPPED | OUTPATIENT
Start: 2024-05-10

## 2024-05-10 RX ORDER — ACETAMINOPHEN 160 MG/5ML
200 SUSPENSION, ORAL (FINAL DOSE FORM) ORAL DAILY
Qty: 180 CAPSULE | Refills: 3 | Status: SHIPPED | OUTPATIENT
Start: 2024-05-10 | End: 2025-05-10

## 2024-05-10 RX ORDER — ASPIRIN 81 MG/1
81 TABLET ORAL DAILY
Qty: 90 TABLET | Refills: 3 | Status: SHIPPED | OUTPATIENT
Start: 2024-05-10 | End: 2025-05-10

## 2024-05-10 NOTE — PATIENT INSTRUCTIONS
Wear support stockings -   knee highs, toe +/- heel cut outs +/- side zippers.   For best performance,apply in am, after shower while lying in bed: Massage legs towards abdomen then roll stockings up towards the knees making sure that the pressure is higher at the ankles than at the shins and knees.  Buy at least 4 pairs and rotate them each day, hand wash, cold water, gentle detergent, drip dry.    In the [facility] area, fitted graduated counter-pressure lower body garments (knee, thigh or waist high stockings, abdominal binders etc) can be obtained at a reduced, affordable price from:                    # [facility], [address] [place] [address]                                                           Tel:    [phone]       Fax: [phone]                    # [employer]                       # [facility]                                              # [facility]                                                # [facility]                                # [place]                                 [phone]    Or on the internet:                     [employer]                    [email or web address]                    [employer] (their own brand: Truform)     [employer]- copper compression    At least 25 mmHg or more

## 2024-05-10 NOTE — PROGRESS NOTES
Subjective:    Patient ID:  Lindsey Green is a 73 y.o. female who presents for follow-up of   Chief Complaint   Patient presents with    Coronary Artery Disease    Hypertension       HPI:    Lindsey Green is here for follow-up visit. Denies chest pain or shortness of breath. Denies recent fever cough chills or congestion. Denies blood in the urine or blood in the stool.  Denies myalgias. Denies orthopnea+ periphereal edema. Denies nausea vomiting or dyspepsia. No recent arm neck or jaw pain. No lightheadedness or dizziness. She has some edema to the left leg worse than right. She has some Venous insufficiency.           Review of patient's allergies indicates:   Allergen Reactions    Pcn [penicillins]        Past Medical History:   Diagnosis Date    Coronary artery disease involving native coronary artery 11/22/2016    Hypertension      Past Surgical History:   Procedure Laterality Date    ABDOMINAL SURGERY      hysterectomy    BREAST SURGERY      lumpectomy    COLON SURGERY      FRACTURE SURGERY      right wrist    HYSTERECTOMY       Social History     Tobacco Use    Smoking status: Never    Smokeless tobacco: Never   Substance Use Topics    Alcohol use: No    Drug use: No     Family History   Problem Relation Name Age of Onset    No Known Problems Sister      COPD Brother          Review of Systems:   Constitution: Negative for diaphoresis and fever.   HEENT: Negative for nosebleeds.    Cardiovascular: Negative for chest pain       No dyspnea on exertion       No leg swelling        No palpitations  Respiratory: Negative for shortness of breath and wheezing.    Hematologic/Lymphatic: Negative for bleeding problem. Does not bruise/bleed easily.   Skin: Negative for color change and rash.   Musculoskeletal: Negative for falls and myalgias.   Gastrointestinal: Negative for hematemesis and hematochezia.   Genitourinary: Negative for hematuria.   Neurological: Negative for dizziness and light-headedness.    Psychiatric/Behavioral: Negative for altered mental status and memory loss.          Objective:        Vitals:    05/10/24 0913   BP: 124/71   Pulse: (!) 57       Lab Results   Component Value Date    WBC 5.40 11/24/2016    HGB 13.5 11/24/2016    HCT 40.7 11/24/2016     11/24/2016    CHOL 149 11/04/2022    TRIG 31 11/04/2022    HDL 78 (H) 11/04/2022    ALT 19 09/07/2023    AST 30 09/07/2023     09/07/2023     09/07/2023    K 4.1 09/07/2023    K 4.1 09/07/2023     09/07/2023     09/07/2023    CREATININE 1.4 09/07/2023    CREATININE 1.4 09/07/2023    BUN 16 09/07/2023    BUN 16 09/07/2023    CO2 29 09/07/2023    CO2 29 09/07/2023    TSH 1.180 09/07/2023    TSH 1.180 09/07/2023        ECHOCARDIOGRAM RESULTS  No results found for this or any previous visit.        CURRENT/PREVIOUS VISIT EKG  Results for orders placed or performed in visit on 05/23/22   IN OFFICE EKG 12-LEAD (to [place])    Collection Time: 05/23/22 10:33 AM    Narrative    Test Reason : I25.10,    Vent. Rate : 064 BPM     Atrial Rate : 064 BPM     P-R Int : 162 ms          QRS Dur : 082 ms      QT Int : 392 ms       P-R-T Axes : 091 035 010 degrees     QTc Int : 404 ms    Normal sinus rhythm  Low voltage QRS  Inferior infarct (cited on or before 21-NOV-2016)  Possible Anterolateral infarct ,age undetermined  Abnormal ECG  When compared with ECG of 23-NOV-2016 00:31,  Significant changes have occurred  Confirmed by Nuno Neri MD ([id number]) on 6/1/2022 9:09:03 AM    Referred By:             Confirmed By:Nuno Neri MD     No valid procedures specified.   Results for orders placed during the hospital encounter of 04/27/23    Nuclear Stress - Cardiology Interpreted    Interpretation Summary    Abnormal myocardial perfusion scan.  There is no reversible ischemia.    There is a moderate intensity, moderate sized, fixed perfusion abnormality consistent with scar in the basal to apical inferior and inferolateral wall(s) in  the typical distribution of the RCA territory.    There are no other significant perfusion abnormalities.    There is a  moderate to severe intensity fixed perfusion abnormality in the inferior wall of the left ventricle secondary to diaphragm attenuation.    The gated perfusion images showed an ejection fraction of 47% at rest. The gated perfusion images showed an ejection fraction of 72% post stress. Normal ejection fraction is greater than 53%.    There is normal wall motion at rest and post stress.    LV cavity size is normal at rest and normal at stress.    The ECG portion of the study is negative for ischemia.    The patient reported no chest pain during the stress test.    There were no arrhythmias during stress.      Physical Exam:  CONSTITUTIONAL: No fever, no chills  HEENT: Normocephalic, atraumatic,pupils reactive to light                 NECK:  No JVD no carotid bruit  CVS: S1S2+, RRR, no murmurs,   LUNGS: Clear  ABDOMEN: Soft, NT, BS+  EXTREMITIES: No cyanosis, edema  : No branham catheter  NEURO: AAO X 3  PSY: Normal affect      Medication List with Changes/Refills   New Medications    ASPIRIN (ECOTRIN) 81 MG EC TABLET    Take 1 tablet (81 mg total) by mouth once daily.    COENZYME Q10 200 MG CAPSULE    Take 200 mg by mouth once daily.    MAGNESIUM OXIDE (MAG-OX) 400 MG (241.3 MG MAGNESIUM) TABLET    Take 1 tablet (400 mg total) by mouth once daily.    POTASSIUM CITRATE 99 MG CAP    Take 99 mg by mouth Daily.   Current Medications    ASPIRIN (ECOTRIN) 81 MG EC TABLET    Take 1 tablet (81 mg total) by mouth once daily.    CLOPIDOGREL (PLAVIX) 75 MG TABLET    Take 1 tablet (75 mg total) by mouth once daily.    COENZYME Q10 200 MG CAPSULE    Take 200 mg by mouth once daily.    FUROSEMIDE (LASIX) 20 MG TABLET    Take 1 tablet (20 mg total) by mouth once daily.    LISINOPRIL (PRINIVIL,ZESTRIL) 20 MG TABLET    Take 1 tablet (20 mg total) by mouth 2 (two) times daily.    METOPROLOL TARTRATE (LOPRESSOR) 25 MG  TABLET    Take 1/2 (one-half) tablet by mouth twice daily Strength: 25 mg    PANTOPRAZOLE (PROTONIX) 40 MG TABLET    Take 1 tablet (40 mg total) by mouth once daily.    POTASSIUM 99 MG TAB    Take by mouth once.    ROSUVASTATIN (CRESTOR) 20 MG TABLET    Take 1 tablet (20 mg total) by mouth once daily.    SERTRALINE (ZOLOFT) 25 MG TABLET    Take 1 tablet by mouth once daily    TRIAMTERENE-HYDROCHLOROTHIAZIDE 37.5-25 MG (DYAZIDE) 37.5-25 MG PER CAPSULE    Take 1 capsule by mouth every morning.             Assessment:       1. Other secondary hypertension    2. Primary hypertension    3. Dyslipidemia    4. BMI 35.0-35.9,adult    5. Stage 3b chronic kidney disease    6. Coronary artery disease involving native coronary artery of native heart with angina pectoris         Plan:       Hyperlipidemia Medications               rosuvastatin (CRESTOR) 20 MG tablet Take 1 tablet (20 mg total) by mouth once daily.           Hypertension Medications               lisinopriL (PRINIVIL,ZESTRIL) 20 MG tablet Take 1 tablet (20 mg total) by mouth 2 (two) times daily.    metoprolol tartrate (LOPRESSOR) 25 MG tablet Take 1/2 (one-half) tablet by mouth twice daily Strength: 25 mg    triamterene-hydrochlorothiazide 37.5-25 mg (DYAZIDE) 37.5-25 mg per capsule Take 1 capsule by mouth every morning.    furosemide (LASIX) 20 MG tablet Take 1 tablet (20 mg total) by mouth once daily.                      Problem List Items Addressed This Visit          Cardiac/Vascular    Primary hypertension    Overview     Chronic problem. Will continue chronic medications and monitor for any changes, adjusting as needed.           Coronary artery disease involving native coronary artery of native heart with angina pectoris    Dyslipidemia    Other secondary hypertension - Primary    Relevant Medications    magnesium oxide (MAG-OX) 400 mg (241.3 mg magnesium) tablet    aspirin (ECOTRIN) 81 MG EC tablet    coenzyme Q10 200 mg capsule    potassium citrate 99  mg Cap    Other Relevant Orders    Echo       Renal/    Stage 3b chronic kidney disease       Endocrine    BMI 35.0-35.9,adult       No follow-ups on file.       LAMAR Tirado-ACNP, CVNP-BC

## 2024-05-15 ENCOUNTER — TELEPHONE (OUTPATIENT)
Dept: CARDIOLOGY | Facility: CLINIC | Age: 74
End: 2024-05-15
Payer: MEDICARE

## 2024-05-15 ENCOUNTER — HOSPITAL ENCOUNTER (OUTPATIENT)
Dept: CARDIOLOGY | Facility: HOSPITAL | Age: 74
Discharge: HOME | End: 2024-05-15
Attending: NURSE PRACTITIONER
Payer: MEDICARE

## 2024-05-15 VITALS — HEIGHT: 67 IN | BODY MASS INDEX: 34.84 KG/M2 | WEIGHT: 222 LBS

## 2024-05-15 DIAGNOSIS — I15.8 OTHER SECONDARY HYPERTENSION: ICD-10-CM

## 2024-05-15 PROCEDURE — 93306 TTE W/DOPPLER COMPLETE: CPT

## 2024-05-15 PROCEDURE — 93306 TTE W/DOPPLER COMPLETE: CPT | Mod: 26,,, | Performed by: INTERNAL MEDICINE

## 2024-05-15 NOTE — TELEPHONE ENCOUNTER
----- Message from Amada El sent at 5/15/2024  8:26 AM CDT -----  PATIENT DROPPED OFF A DL DISABILITY PARKING [employer] TO BE SIGNED .. COULD YOU PLEASE CALL HER WHEN IT IS READY TO BE PICKED UP.. SHE SAID SHE WAS HERE LAST WEEK AND  DID NOT KNOW THAT SHE NEEDED THIS.. I WILL PUT IN PROVIDERS BOX.. PATIENT PHONE NUMBER [phone] PATIENT WANTS TO KNOW IF YOU CAN MAIL HER THE PAPER AFTER ITS FILLED OUT . [address] [place] [address]

## 2024-05-15 NOTE — TELEPHONE ENCOUNTER
----- Message from Amada El sent at 5/15/2024  9:01 AM CDT -----    ----- Message -----  From: Amada El  Sent: 5/15/2024   8:28 AM CDT  To: Luis Olson Staff    PATIENT DROPPED OFF A DL DISABILITY PARKING [employer] TO BE SIGNED .. COULD YOU PLEASE CALL HER WHEN IT IS READY TO BE PICKED UP.. SHE SAID SHE WAS HERE LAST WEEK AND  DID NOT KNOW THAT SHE NEEDED THIS.. I WILL PUT IN PROVIDERS BOX.. PATIENT PHONE NUMBER [phone]

## 2024-05-18 LAB
AV INDEX (PROSTH): 0.9
AV MEAN GRADIENT: 3 MMHG
AV PEAK GRADIENT: 5 MMHG
AV VALVE AREA BY VELOCITY RATIO: 2.87 CM²
AV VALVE AREA: 2.81 CM²
AV VELOCITY RATIO: 0.91
BSA FOR ECHO PROCEDURE: 2.18 M2
CV ECHO LV RWT: 0.38 CM
DOP CALC AO PEAK VEL: 1.15 M/S
DOP CALC AO VTI: 25.9 CM
DOP CALC LVOT AREA: 3.1 CM2
DOP CALC LVOT DIAMETER: 2 CM
DOP CALC LVOT PEAK VEL: 1.05 M/S
DOP CALC LVOT STROKE VOLUME: 72.85 CM3
DOP CALCLVOT PEAK VEL VTI: 23.2 CM
E WAVE DECELERATION TIME: 206 MSEC
E/A RATIO: 0.75
E/E' RATIO: 6.87 M/S
ECHO LV POSTERIOR WALL: 1.02 CM (ref 0.6–1.1)
FRACTIONAL SHORTENING: 33 % (ref 28–44)
INTERVENTRICULAR SEPTUM: 0.78 CM (ref 0.6–1.1)
IVRT: 137 MSEC
LEFT ATRIUM SIZE: 4.2 CM
LEFT INTERNAL DIMENSION IN SYSTOLE: 3.64 CM (ref 2.1–4)
LEFT VENTRICLE DIASTOLIC VOLUME INDEX: 68.25 ML/M2
LEFT VENTRICLE DIASTOLIC VOLUME: 144 ML
LEFT VENTRICLE MASS INDEX: 86 G/M2
LEFT VENTRICLE SYSTOLIC VOLUME INDEX: 26.5 ML/M2
LEFT VENTRICLE SYSTOLIC VOLUME: 55.9 ML
LEFT VENTRICULAR INTERNAL DIMENSION IN DIASTOLE: 5.44 CM (ref 3.5–6)
LEFT VENTRICULAR MASS: 182.4 G
LV LATERAL E/E' RATIO: 5.27 M/S
LV SEPTAL E/E' RATIO: 9.88 M/S
LVOT MG: 2 MMHG
LVOT MV: 0.68 CM/S
MV PEAK A VEL: 1.05 M/S
MV PEAK E VEL: 0.79 M/S
MV STENOSIS PRESSURE HALF TIME: 63 MS
MV VALVE AREA P 1/2 METHOD: 3.49 CM2
OHS CV RV/LV RATIO: 0.47 CM
PISA TR MAX VEL: 2.08 M/S
RA PRESSURE ESTIMATED: 3 MMHG
RIGHT VENTRICULAR END-DIASTOLIC DIMENSION: 2.58 CM
RV TB RVSP: 5 MMHG
TDI LATERAL: 0.15 M/S
TDI SEPTAL: 0.08 M/S
TDI: 0.12 M/S
TR MAX PG: 17 MMHG
TV REST PULMONARY ARTERY PRESSURE: 20 MMHG
Z-SCORE OF LEFT VENTRICULAR DIMENSION IN END DIASTOLE: -1.95
Z-SCORE OF LEFT VENTRICULAR DIMENSION IN END SYSTOLE: -0.82

## 2024-09-09 RX ORDER — TRIAMTERENE AND HYDROCHLOROTHIAZIDE 37.5; 25 MG/1; MG/1
1 CAPSULE ORAL EVERY MORNING
Qty: 90 CAPSULE | Refills: 0 | Status: SHIPPED | OUTPATIENT
Start: 2024-09-09

## 2024-09-09 RX ORDER — PANTOPRAZOLE SODIUM 40 MG/1
40 TABLET, DELAYED RELEASE ORAL
Qty: 90 TABLET | Refills: 0 | Status: SHIPPED | OUTPATIENT
Start: 2024-09-09

## 2024-09-10 RX ORDER — CLOPIDOGREL BISULFATE 75 MG/1
75 TABLET ORAL
Qty: 90 TABLET | Refills: 0 | Status: SHIPPED | OUTPATIENT
Start: 2024-09-10

## 2024-09-16 RX ORDER — PANTOPRAZOLE SODIUM 40 MG/1
40 TABLET, DELAYED RELEASE ORAL
Qty: 90 TABLET | Refills: 0 | Status: SHIPPED | OUTPATIENT
Start: 2024-09-16

## 2024-09-24 RX ORDER — ROSUVASTATIN CALCIUM 20 MG/1
20 TABLET, COATED ORAL
Qty: 90 TABLET | Refills: 3 | Status: SHIPPED | OUTPATIENT
Start: 2024-09-24

## 2024-09-27 DIAGNOSIS — I10 HYPERTENSION, UNSPECIFIED TYPE: Primary | ICD-10-CM

## 2024-09-27 DIAGNOSIS — I10 PRIMARY HYPERTENSION: ICD-10-CM

## 2024-09-27 RX ORDER — METOPROLOL TARTRATE 25 MG/1
TABLET, FILM COATED ORAL
Qty: 90 TABLET | Refills: 0 | Status: SHIPPED | OUTPATIENT
Start: 2024-09-27

## 2024-10-02 ENCOUNTER — OFFICE VISIT (OUTPATIENT)
Dept: CARDIOLOGY | Facility: CLINIC | Age: 74
End: 2024-10-02
Payer: MEDICARE

## 2024-10-02 VITALS
DIASTOLIC BLOOD PRESSURE: 78 MMHG | WEIGHT: 238.13 LBS | HEIGHT: 67 IN | SYSTOLIC BLOOD PRESSURE: 161 MMHG | BODY MASS INDEX: 37.37 KG/M2 | HEART RATE: 60 BPM

## 2024-10-02 DIAGNOSIS — N18.32 STAGE 3B CHRONIC KIDNEY DISEASE: ICD-10-CM

## 2024-10-02 DIAGNOSIS — I10 PRIMARY HYPERTENSION: ICD-10-CM

## 2024-10-02 DIAGNOSIS — E78.5 DYSLIPIDEMIA: Primary | ICD-10-CM

## 2024-10-02 PROCEDURE — 3078F DIAST BP <80 MM HG: CPT | Mod: CPTII,S$GLB,, | Performed by: NURSE PRACTITIONER

## 2024-10-02 PROCEDURE — 3077F SYST BP >= 140 MM HG: CPT | Mod: CPTII,S$GLB,, | Performed by: NURSE PRACTITIONER

## 2024-10-02 PROCEDURE — 99214 OFFICE O/P EST MOD 30 MIN: CPT | Mod: S$GLB,,, | Performed by: NURSE PRACTITIONER

## 2024-10-02 PROCEDURE — 3008F BODY MASS INDEX DOCD: CPT | Mod: CPTII,S$GLB,, | Performed by: NURSE PRACTITIONER

## 2024-10-02 PROCEDURE — 4010F ACE/ARB THERAPY RXD/TAKEN: CPT | Mod: CPTII,S$GLB,, | Performed by: NURSE PRACTITIONER

## 2024-10-02 PROCEDURE — 1159F MED LIST DOCD IN RCRD: CPT | Mod: CPTII,S$GLB,, | Performed by: NURSE PRACTITIONER

## 2024-10-02 PROCEDURE — 99999 PR PBB SHADOW E&M-EST. PATIENT-LVL III: CPT | Mod: PBBFAC,,, | Performed by: NURSE PRACTITIONER

## 2024-10-02 PROCEDURE — 1101F PT FALLS ASSESS-DOCD LE1/YR: CPT | Mod: CPTII,S$GLB,, | Performed by: NURSE PRACTITIONER

## 2024-10-02 PROCEDURE — 1126F AMNT PAIN NOTED NONE PRSNT: CPT | Mod: CPTII,S$GLB,, | Performed by: NURSE PRACTITIONER

## 2024-10-02 PROCEDURE — 3288F FALL RISK ASSESSMENT DOCD: CPT | Mod: CPTII,S$GLB,, | Performed by: NURSE PRACTITIONER

## 2024-10-02 RX ORDER — TRIAMTERENE AND HYDROCHLOROTHIAZIDE 37.5; 25 MG/1; MG/1
1 CAPSULE ORAL EVERY MORNING
Qty: 90 CAPSULE | Refills: 3 | Status: SHIPPED | OUTPATIENT
Start: 2024-10-02

## 2024-10-02 RX ORDER — METOPROLOL TARTRATE 25 MG/1
TABLET, FILM COATED ORAL
Qty: 90 TABLET | Refills: 0 | Status: SHIPPED | OUTPATIENT
Start: 2024-10-02

## 2024-10-02 RX ORDER — CLOPIDOGREL BISULFATE 75 MG/1
75 TABLET ORAL DAILY
Qty: 90 TABLET | Refills: 3 | Status: SHIPPED | OUTPATIENT
Start: 2024-10-02

## 2024-10-02 NOTE — PROGRESS NOTES
Subjective:    Patient ID:  Lindsey Green is a 73 y.o. female who presents for follow-up of No chief complaint on file.      HPI:      Lindsey Green is here for follow-up visit. Denies chest pain or shortness of breath. Denies recent fever cough chills or congestion. Denies blood in the urine or blood in the stool.  Denies myalgias. Denies orthopnea. Denies nausea vomiting or dyspepsia. No recent arm neck or jaw pain. No lightheadedness or dizziness.      Review of patient's allergies indicates:   Allergen Reactions    Pcn [penicillins]        Past Medical History:   Diagnosis Date    Coronary artery disease involving native coronary artery 11/22/2016    Hypertension      Past Surgical History:   Procedure Laterality Date    ABDOMINAL SURGERY      hysterectomy    BREAST SURGERY      lumpectomy    COLON SURGERY      FRACTURE SURGERY      right wrist    HYSTERECTOMY       Social History     Tobacco Use    Smoking status: Never    Smokeless tobacco: Never   Substance Use Topics    Alcohol use: No    Drug use: No     Family History   Problem Relation Name Age of Onset    No Known Problems Sister      COPD Brother          Review of Systems:   Constitution: Negative for diaphoresis and fever.   HEENT: Negative for nosebleeds.    Cardiovascular: Negative for chest pain       No dyspnea on exertion       + leg swelling        No palpitations  Respiratory: Negative for shortness of breath and wheezing.    Hematologic/Lymphatic: Negative for bleeding problem. Does not bruise/bleed easily.   Skin: Negative for color change and rash.   Musculoskeletal: Negative for falls and myalgias.   Gastrointestinal: Negative for hematemesis and hematochezia.   Genitourinary: Negative for hematuria.   Neurological: Negative for dizziness and light-headedness.   Psychiatric/Behavioral: Negative for altered mental status and memory loss.          Objective:        Vitals:    10/02/24 0946   BP: (!) 161/78   Pulse: 60       Lab Results    Component Value Date    WBC 5.40 11/24/2016    HGB 13.5 11/24/2016    HCT 40.7 11/24/2016     11/24/2016    CHOL 149 11/04/2022    TRIG 31 11/04/2022    HDL 78 (H) 11/04/2022    ALT 19 09/07/2023    AST 30 09/07/2023     09/07/2023     09/07/2023    K 4.1 09/07/2023    K 4.1 09/07/2023     09/07/2023     09/07/2023    CREATININE 1.4 09/07/2023    CREATININE 1.4 09/07/2023    BUN 16 09/07/2023    BUN 16 09/07/2023    CO2 29 09/07/2023    CO2 29 09/07/2023    TSH 1.180 09/07/2023    TSH 1.180 09/07/2023        ECHOCARDIOGRAM RESULTS  Results for orders placed during the hospital encounter of 05/15/24    Echo    Interpretation Summary    Left Ventricle: The left ventricle is normal in size. Normal wall thickness. Normal wall motion. There is normal systolic function with a visually estimated ejection fraction of 60 - 65%. There is normal diastolic function.    Right Ventricle: Normal right ventricular cavity size. Wall thickness is normal. Systolic function is normal.    Aortic Valve: There is mild annular calcification present.    Mitral Valve: There is mild regurgitation with a posterolateral eccentriccally directed jet.    Tricuspid Valve: There is mild regurgitation with a centrally directed jet.    Pulmonary Artery: The estimated pulmonary artery systolic pressure is 20 mmHg.    IVC/SVC: Normal venous pressure at 3 mmHg.        CURRENT/PREVIOUS VISIT EKG  Results for orders placed or performed in visit on 05/23/22   IN OFFICE EKG 12-LEAD (to [place])    Collection Time: 05/23/22 10:33 AM    Narrative    Test Reason : I25.10,    Vent. Rate : 064 BPM     Atrial Rate : 064 BPM     P-R Int : 162 ms          QRS Dur : 082 ms      QT Int : 392 ms       P-R-T Axes : 091 035 010 degrees     QTc Int : 404 ms    Normal sinus rhythm  Low voltage QRS  Inferior infarct (cited on or before 21-NOV-2016)  Possible Anterolateral infarct ,age undetermined  Abnormal ECG  When compared with ECG of  23-NOV-2016 00:31,  Significant changes have occurred  Confirmed by Nuno Neri MD ([id number]) on 6/1/2022 9:09:03 AM    Referred By:             Confirmed By:Nuno Neri MD     No valid procedures specified.   Results for orders placed during the hospital encounter of 04/27/23    Nuclear Stress - Cardiology Interpreted    Interpretation Summary    Abnormal myocardial perfusion scan.  There is no reversible ischemia.    There is a moderate intensity, moderate sized, fixed perfusion abnormality consistent with scar in the basal to apical inferior and inferolateral wall(s) in the typical distribution of the RCA territory.    There are no other significant perfusion abnormalities.    There is a  moderate to severe intensity fixed perfusion abnormality in the inferior wall of the left ventricle secondary to diaphragm attenuation.    The gated perfusion images showed an ejection fraction of 47% at rest. The gated perfusion images showed an ejection fraction of 72% post stress. Normal ejection fraction is greater than 53%.    There is normal wall motion at rest and post stress.    LV cavity size is normal at rest and normal at stress.    The ECG portion of the study is negative for ischemia.    The patient reported no chest pain during the stress test.    There were no arrhythmias during stress.      Physical Exam:  CONSTITUTIONAL: No fever, no chills  HEENT: Normocephalic, atraumatic,pupils reactive to light                 NECK:  No JVD no carotid bruit  CVS: S1S2+, RRR, no murmurs,   LUNGS: Clear  ABDOMEN: Soft, NT, BS+  EXTREMITIES: No cyanosis, +edema  : No branham catheter  NEURO: AAO X 3  PSY: Normal affect      Medication List with Changes/Refills   Current Medications    ASPIRIN (ECOTRIN) 81 MG EC TABLET    Take 1 tablet (81 mg total) by mouth once daily.    ASPIRIN (ECOTRIN) 81 MG EC TABLET    Take 1 tablet (81 mg total) by mouth once daily.    COENZYME Q10 200 MG CAPSULE    Take 200 mg by mouth once daily.     COENZYME Q10 200 MG CAPSULE    Take 200 mg by mouth once daily.    LISINOPRIL (PRINIVIL,ZESTRIL) 20 MG TABLET    Take 1 tablet (20 mg total) by mouth 2 (two) times daily.    MAGNESIUM OXIDE (MAG-OX) 400 MG (241.3 MG MAGNESIUM) TABLET    Take 1 tablet (400 mg total) by mouth once daily.    PANTOPRAZOLE (PROTONIX) 40 MG TABLET    Take 1 tablet by mouth once daily    POTASSIUM 99 MG TAB    Take by mouth once.    POTASSIUM CITRATE 99 MG CAP    Take 99 mg by mouth Daily.    ROSUVASTATIN (CRESTOR) 20 MG TABLET    Take 1 tablet by mouth once daily    SERTRALINE (ZOLOFT) 25 MG TABLET    Take 1 tablet by mouth once daily   Changed and/or Refilled Medications    Modified Medication Previous Medication    CLOPIDOGREL (PLAVIX) 75 MG TABLET clopidogreL (PLAVIX) 75 mg tablet       Take 1 tablet (75 mg total) by mouth once daily.    Take 1 tablet by mouth once daily    METOPROLOL TARTRATE (LOPRESSOR) 25 MG TABLET metoprolol tartrate (LOPRESSOR) 25 MG tablet       Take 1/2 (one-half) tablet by mouth twice daily    Take 1/2 (one-half) tablet by mouth twice daily    TRIAMTERENE-HYDROCHLOROTHIAZIDE 37.5-25 MG (DYAZIDE) 37.5-25 MG PER CAPSULE triamterene-hydrochlorothiazide 37.5-25 mg (DYAZIDE) 37.5-25 mg per capsule       Take 1 capsule by mouth every morning.    Take 1 capsule by mouth once daily in the morning   Discontinued Medications    FUROSEMIDE (LASIX) 20 MG TABLET    Take 1 tablet (20 mg total) by mouth once daily.             Assessment:       1. Dyslipidemia    2. Primary hypertension    3. Stage 3b chronic kidney disease    4. BMI 35.0-35.9,adult         Plan:     Problem List Items Addressed This Visit          Cardiac/Vascular    Primary hypertension    Overview     Chronic problem. Will continue chronic medications and monitor for any changes, adjusting as needed.           Current Assessment & Plan     BP High today,   Has not had her BP meds however.   Legs slightly edematous  Continue current regime  Resume  Dyazide and Lisinopril  Continue Metoprolol         Relevant Medications    metoprolol tartrate (LOPRESSOR) 25 MG tablet    Other Relevant Orders    IN OFFICE EKG 12-LEAD (to [place])    Dyslipidemia - Primary    Current Assessment & Plan     Continue Crestor 20 mg daily            Renal/    Stage 3b chronic kidney disease    Current Assessment & Plan     Stable  Per PCP            Endocrine    BMI 35.0-35.9,adult    Current Assessment & Plan     Recommend  low fat,low salt, low cholesterol diet and exercise            No follow-ups on file.       LAMAR Tirado-ACNP, CVNP-BC

## 2024-10-02 NOTE — ASSESSMENT & PLAN NOTE
BP High today,   Has not had her BP meds however.   Legs slightly edematous  Continue current regime  Resume Dyazide and Lisinopril  Continue Metoprolol

## 2024-10-04 LAB
OHS QRS DURATION: 74 MS
OHS QTC CALCULATION: 410 MS

## 2024-10-29 RX ORDER — LISINOPRIL 20 MG/1
20 TABLET ORAL 2 TIMES DAILY
Qty: 180 TABLET | Refills: 0 | Status: SHIPPED | OUTPATIENT
Start: 2024-10-29

## 2024-11-22 ENCOUNTER — TELEPHONE (OUTPATIENT)
Dept: CARDIOLOGY | Facility: CLINIC | Age: 74
End: 2024-11-22
Payer: MEDICARE

## 2024-11-22 NOTE — TELEPHONE ENCOUNTER
----- Message from Ming sent at 11/22/2024  8:02 AM CST -----  Regarding: return call  Contact: patient  Type:  Patient Returning Call    Who Called:patient  Who Left Message for Patient:staff  Does the patient know what this is regarding?:is MD Neri in her new insurance plan  Would the patient rather a call back or a response via [facility]?   Best Call Back Number:[phone]  Additional Information:

## 2024-11-22 NOTE — TELEPHONE ENCOUNTER
CALLED AND SPOKE WITH PT, WE ARE NOT TAKING THE [employer] MANAGED MEDICARE PLAN, SHE WILL BE CALLING HER INSURANCE PLAN TO SEE WHAT PLAN DR. BOWLING IS ON

## 2024-12-05 RX ORDER — SERTRALINE HYDROCHLORIDE 25 MG/1
25 TABLET, FILM COATED ORAL
Qty: 90 TABLET | Refills: 3 | Status: SHIPPED | OUTPATIENT
Start: 2024-12-05

## 2024-12-09 RX ORDER — LISINOPRIL 20 MG/1
20 TABLET ORAL 2 TIMES DAILY
Qty: 180 TABLET | Refills: 0 | Status: SHIPPED | OUTPATIENT
Start: 2024-12-09

## 2025-03-17 DIAGNOSIS — I10 PRIMARY HYPERTENSION: ICD-10-CM

## 2025-03-17 RX ORDER — METOPROLOL TARTRATE 25 MG/1
12.5 TABLET, FILM COATED ORAL 2 TIMES DAILY
Qty: 90 TABLET | Refills: 1 | Status: SHIPPED | OUTPATIENT
Start: 2025-03-17

## 2025-03-27 ENCOUNTER — TELEPHONE (OUTPATIENT)
Dept: CARDIOLOGY | Facility: CLINIC | Age: 75
End: 2025-03-27
Payer: MEDICARE

## 2025-03-27 NOTE — TELEPHONE ENCOUNTER
----- Message from Brit sent at 3/27/2025  2:47 PM CDT -----  Regarding: pharm call  Type:  Pharmacy Calling to Clarify an RXName of Caller:Mae Pharmacy Name:[facility] - [place], [place] - [address] [place] [id number]: [phone] Fax: [phone] Name:triamterene-hydrochlorothiazide 37.5-25 mg (DYAZIDE) 37.5-25 mg per capsule 90 capsule 3 10/2/2024 - Sig - Route: Take 1 capsule by mouth every morning. - Oral Sent to pharmacy as: triamterene-hydrochlorothiazide 37.5-25 mg (DYAZIDE) 37.5-25 mg per capsule Notes to Pharmacy: . E-Prescribing Status: Receipt confirmed by pharmacy (10/2/2024  9:57 AM CDT) What do they need to clarify?:drug interaction Best Call Back Number:see above Additional Information: drug interaction with lisinopriL (PRINIVIL,ZESTRIL) 20 MG tablet. Please call to discuss.